# Patient Record
Sex: FEMALE | Race: WHITE | NOT HISPANIC OR LATINO | Employment: FULL TIME | URBAN - METROPOLITAN AREA
[De-identification: names, ages, dates, MRNs, and addresses within clinical notes are randomized per-mention and may not be internally consistent; named-entity substitution may affect disease eponyms.]

---

## 2017-06-10 ENCOUNTER — OFFICE VISIT (OUTPATIENT)
Dept: URGENT CARE | Facility: MEDICAL CENTER | Age: 47
End: 2017-06-10
Payer: COMMERCIAL

## 2017-06-10 DIAGNOSIS — R35.0 FREQUENCY OF MICTURITION: ICD-10-CM

## 2017-06-10 PROCEDURE — 99213 OFFICE O/P EST LOW 20 MIN: CPT

## 2017-06-11 ENCOUNTER — APPOINTMENT (OUTPATIENT)
Dept: LAB | Facility: HOSPITAL | Age: 47
End: 2017-06-11
Payer: COMMERCIAL

## 2017-06-11 DIAGNOSIS — R35.0 FREQUENCY OF MICTURITION: ICD-10-CM

## 2017-06-11 PROCEDURE — 87086 URINE CULTURE/COLONY COUNT: CPT

## 2017-06-11 PROCEDURE — 87077 CULTURE AEROBIC IDENTIFY: CPT

## 2017-06-11 PROCEDURE — 87186 SC STD MICRODIL/AGAR DIL: CPT

## 2017-06-13 LAB — BACTERIA UR CULT: NORMAL

## 2018-01-09 ENCOUNTER — TRANSCRIBE ORDERS (OUTPATIENT)
Dept: ADMINISTRATIVE | Facility: HOSPITAL | Age: 48
End: 2018-01-09

## 2018-01-09 DIAGNOSIS — Z12.39 SCREENING BREAST EXAMINATION: Primary | ICD-10-CM

## 2018-01-19 ENCOUNTER — HOSPITAL ENCOUNTER (OUTPATIENT)
Dept: RADIOLOGY | Age: 48
Discharge: HOME | End: 2018-01-19
Payer: COMMERCIAL

## 2018-01-19 DIAGNOSIS — Z12.39 SCREENING BREAST EXAMINATION: ICD-10-CM

## 2018-01-19 PROCEDURE — 77067 SCR MAMMO BI INCL CAD: CPT

## 2019-03-28 ENCOUNTER — TRANSCRIBE ORDERS (OUTPATIENT)
Dept: ADMINISTRATIVE | Facility: HOSPITAL | Age: 49
End: 2019-03-28

## 2019-03-28 DIAGNOSIS — Z12.39 SCREENING BREAST EXAMINATION: Primary | ICD-10-CM

## 2019-04-04 ENCOUNTER — HOSPITAL ENCOUNTER (OUTPATIENT)
Dept: RADIOLOGY | Age: 49
Discharge: HOME | End: 2019-04-04
Payer: COMMERCIAL

## 2019-04-04 VITALS — HEIGHT: 65 IN | BODY MASS INDEX: 37.99 KG/M2 | WEIGHT: 228 LBS

## 2019-04-04 DIAGNOSIS — Z12.39 SCREENING BREAST EXAMINATION: ICD-10-CM

## 2019-04-04 PROCEDURE — 77067 SCR MAMMO BI INCL CAD: CPT

## 2020-06-25 ENCOUNTER — TRANSCRIBE ORDERS (OUTPATIENT)
Dept: ADMINISTRATIVE | Facility: HOSPITAL | Age: 50
End: 2020-06-25

## 2020-06-25 DIAGNOSIS — Z12.31 OTHER SCREENING MAMMOGRAM: Primary | ICD-10-CM

## 2020-11-16 ENCOUNTER — OFFICE VISIT (OUTPATIENT)
Dept: URGENT CARE | Facility: CLINIC | Age: 50
End: 2020-11-16
Payer: COMMERCIAL

## 2020-11-16 VITALS
OXYGEN SATURATION: 99 % | HEART RATE: 76 BPM | TEMPERATURE: 97.2 F | DIASTOLIC BLOOD PRESSURE: 70 MMHG | RESPIRATION RATE: 18 BRPM | SYSTOLIC BLOOD PRESSURE: 150 MMHG

## 2020-11-16 DIAGNOSIS — R21 RASH: Primary | ICD-10-CM

## 2020-11-16 PROCEDURE — 99213 OFFICE O/P EST LOW 20 MIN: CPT | Performed by: NURSE PRACTITIONER

## 2020-11-16 RX ORDER — CEPHALEXIN 500 MG/1
500 CAPSULE ORAL EVERY 8 HOURS SCHEDULED
Qty: 21 CAPSULE | Refills: 0 | Status: SHIPPED | OUTPATIENT
Start: 2020-11-16 | End: 2020-11-16

## 2020-11-16 RX ORDER — OMEGA-3S/DHA/EPA/FISH OIL/D3 300MG-1000
400 CAPSULE ORAL DAILY
COMMUNITY

## 2020-11-16 RX ORDER — SPIRONOLACTONE 25 MG/1
25 TABLET ORAL DAILY
COMMUNITY

## 2020-11-16 RX ORDER — CEPHALEXIN 500 MG/1
500 CAPSULE ORAL EVERY 8 HOURS SCHEDULED
Qty: 21 CAPSULE | Refills: 0 | Status: SHIPPED | OUTPATIENT
Start: 2020-11-16 | End: 2020-11-23

## 2020-11-16 RX ORDER — CANDESARTAN 32 MG/1
32 TABLET ORAL DAILY
COMMUNITY

## 2020-11-24 ENCOUNTER — TRANSCRIBE ORDERS (OUTPATIENT)
Dept: ADMINISTRATIVE | Facility: HOSPITAL | Age: 50
End: 2020-11-24

## 2020-11-24 ENCOUNTER — HOSPITAL ENCOUNTER (OUTPATIENT)
Dept: RADIOLOGY | Age: 50
Discharge: HOME | End: 2020-11-24
Payer: COMMERCIAL

## 2020-11-24 VITALS — HEIGHT: 65 IN | WEIGHT: 260 LBS | BODY MASS INDEX: 43.32 KG/M2

## 2020-11-24 DIAGNOSIS — Z12.31 ENCOUNTER FOR SCREENING MAMMOGRAM FOR MALIGNANT NEOPLASM OF BREAST: Primary | ICD-10-CM

## 2020-11-24 DIAGNOSIS — Z12.31 OTHER SCREENING MAMMOGRAM: ICD-10-CM

## 2020-11-24 DIAGNOSIS — Z12.31 ENCOUNTER FOR SCREENING MAMMOGRAM FOR MALIGNANT NEOPLASM OF BREAST: ICD-10-CM

## 2020-11-24 PROCEDURE — 77067 SCR MAMMO BI INCL CAD: CPT

## 2020-11-24 PROCEDURE — 77063 BREAST TOMOSYNTHESIS BI: CPT

## 2021-03-10 DIAGNOSIS — Z23 ENCOUNTER FOR IMMUNIZATION: ICD-10-CM

## 2021-11-27 ENCOUNTER — NURSE TRIAGE (OUTPATIENT)
Dept: OTHER | Facility: OTHER | Age: 51
End: 2021-11-27

## 2021-11-27 DIAGNOSIS — Z11.59 SPECIAL SCREENING EXAMINATION FOR VIRAL DISEASE: Primary | ICD-10-CM

## 2021-11-27 PROCEDURE — U0003 INFECTIOUS AGENT DETECTION BY NUCLEIC ACID (DNA OR RNA); SEVERE ACUTE RESPIRATORY SYNDROME CORONAVIRUS 2 (SARS-COV-2) (CORONAVIRUS DISEASE [COVID-19]), AMPLIFIED PROBE TECHNIQUE, MAKING USE OF HIGH THROUGHPUT TECHNOLOGIES AS DESCRIBED BY CMS-2020-01-R: HCPCS | Performed by: FAMILY MEDICINE

## 2021-11-27 PROCEDURE — U0005 INFEC AGEN DETEC AMPLI PROBE: HCPCS | Performed by: FAMILY MEDICINE

## 2021-11-29 ENCOUNTER — TELEPHONE (OUTPATIENT)
Dept: URGENT CARE | Facility: CLINIC | Age: 51
End: 2021-11-29

## 2021-11-29 ENCOUNTER — TELEPHONE (OUTPATIENT)
Dept: OTHER | Facility: OTHER | Age: 51
End: 2021-11-29

## 2021-12-23 ENCOUNTER — HOSPITAL ENCOUNTER (OUTPATIENT)
Dept: RADIOLOGY | Facility: IMAGING CENTER | Age: 51
Discharge: HOME | End: 2021-12-23
Payer: COMMERCIAL

## 2021-12-23 VITALS — WEIGHT: 250 LBS | HEIGHT: 65 IN | BODY MASS INDEX: 41.65 KG/M2

## 2021-12-23 DIAGNOSIS — Z12.31 ENCOUNTER FOR SCREENING MAMMOGRAM FOR MALIGNANT NEOPLASM OF BREAST: ICD-10-CM

## 2021-12-23 PROCEDURE — 77067 SCR MAMMO BI INCL CAD: CPT

## 2021-12-23 PROCEDURE — 77063 BREAST TOMOSYNTHESIS BI: CPT

## 2022-08-09 ENCOUNTER — APPOINTMENT (OUTPATIENT)
Dept: RADIOLOGY | Facility: CLINIC | Age: 52
End: 2022-08-09
Payer: COMMERCIAL

## 2022-08-09 ENCOUNTER — OFFICE VISIT (OUTPATIENT)
Dept: URGENT CARE | Facility: CLINIC | Age: 52
End: 2022-08-09
Payer: COMMERCIAL

## 2022-08-09 VITALS
DIASTOLIC BLOOD PRESSURE: 71 MMHG | RESPIRATION RATE: 18 BRPM | HEART RATE: 65 BPM | WEIGHT: 250 LBS | TEMPERATURE: 97.8 F | SYSTOLIC BLOOD PRESSURE: 167 MMHG | OXYGEN SATURATION: 99 % | BODY MASS INDEX: 41.65 KG/M2 | HEIGHT: 65 IN

## 2022-08-09 DIAGNOSIS — S39.92XA INJURY OF COCCYX, INITIAL ENCOUNTER: Primary | ICD-10-CM

## 2022-08-09 DIAGNOSIS — S39.92XA INJURY OF COCCYX, INITIAL ENCOUNTER: ICD-10-CM

## 2022-08-09 PROCEDURE — 72220 X-RAY EXAM SACRUM TAILBONE: CPT

## 2022-08-09 PROCEDURE — 99213 OFFICE O/P EST LOW 20 MIN: CPT | Performed by: PHYSICIAN ASSISTANT

## 2022-08-09 NOTE — PROGRESS NOTES
[address] [facility]      NAME: Raven Morales is a 45 y o  female  : 1970    MRN: [id number]  DATE: 2022  TIME: 2:08 PM    Assessment and Plan   Injury of coccyx, initial encounter [S39 [id number]]  1  Injury of coccyx, initial encounter  XR sacrum and coccyx       Patient Instructions   Xray appears negative for any fracture  Will follow up with radiologist report when available  Recommend elevating body part, icing the area every 2 hours for 20-30 minutes, take Ibuprofen every 6-8 hours to reduce inflammation  If not improving over the next week, follow up with PCP or orthopedics  To present to the ER if symptoms worsen  Chief Complaint     Chief Complaint   Patient presents with    Fall     Tripped down steps, tailbone hurts         History of Present Illness   Raven Morales presents to the clinic c/o    Fall  The accident occurred 1 to 3 hours ago  Fall occurred: tripped over dog and landed on butt, tailbone pain since  The point of impact was the buttocks (tailbone area)  The pain is present in the buttocks (tailbone)  The pain is at a severity of 7/10  The pain is moderate  The symptoms are aggravated by sitting  Pertinent negatives include no abdominal pain, fever, headaches, loss of consciousness, nausea, numbness or tingling  She has tried ice for the symptoms  The treatment provided mild relief  Review of Systems   Review of Systems   Constitutional: Negative for chills, diaphoresis, fatigue and fever  HENT: Negative for congestion, ear discharge, ear pain and facial swelling  Eyes: Negative for photophobia, pain, discharge, redness, itching and visual disturbance  Respiratory: Negative for apnea, cough, chest tightness, shortness of breath and wheezing  Cardiovascular: Negative for chest pain and palpitations  Gastrointestinal: Negative for abdominal pain and nausea  Musculoskeletal: Positive for arthralgias (tailbone pain )     Skin: Negative for color change, rash and wound  Neurological: Negative for dizziness, tingling, loss of consciousness, numbness and headaches  Hematological: Negative for adenopathy           Current Medications     Long-Term Medications   Medication Sig Dispense Refill    candesartan (ATACAND) 32 MG tablet Take 32 mg by mouth daily      cholecalciferol (VITAMIN D3) 400 units tablet Take 400 Units by mouth daily      metFORMIN (GLUCOPHAGE) 500 mg tablet Take 500 mg by mouth 2 (two) times a day      spironolactone (ALDACTONE) 25 mg tablet Take 25 mg by mouth daily         Current Allergies     Allergies as of 08/09/2022 - Reviewed 08/09/2022   Allergen Reaction Noted    Dust mite extract Sneezing 11/24/2020    Other Rash 11/24/2020            The following portions of the patient's history were reviewed and updated as appropriate: allergies, current medications, past family history, past medical history, past social history, past surgical history and problem list   Past Medical History:   Diagnosis Date    Diabetes mellitus ([facility] [address] )     Hypertension      Past Surgical History:   Procedure Laterality Date    CHOLECYSTECTOMY      LAPAROSCOPIC GASTRIC BANDING      SHOULDER SURGERY Right 07/20/2022     Social History     Socioeconomic History    Marital status: /Civil Union     Spouse name: Not on file    Number of children: Not on file    Years of education: Not on file    Highest education level: Not on file   Occupational History    Not on file   Tobacco Use    Smoking status: Never Smoker    Smokeless tobacco: Never Used   Vaping Use    Vaping Use: Never used   Substance and Sexual Activity    Alcohol use: Not on file    Drug use: Never    Sexual activity: Not on file   Other Topics Concern    Not on file   Social History Narrative    Not on file     Social Determinants of Health     Financial Resource Strain: Not on file   Food Insecurity: Not on file   Transportation Needs: Not on file   Physical Activity: Not on file Stress: Not on file   Social Connections: Not on file   Intimate Partner Violence: Not on file   Housing Stability: Not on file       Objective   /71   Pulse 65   Temp 97 8 °F (36 6 °C)   Resp 18   Ht 5' 5" (1 651 m)   Wt 113 kg (250 lb)   SpO2 99%   BMI 41 60 kg/m²      Physical Exam     Physical Exam  Vitals and nursing note reviewed  Constitutional:       General: She is not in acute distress  Appearance: She is well-developed  She is not diaphoretic  HENT:      Head: Normocephalic and atraumatic  Right Ear: External ear normal       Left Ear: External ear normal       Nose: Nose normal    Eyes:      General: No scleral icterus  Right eye: No discharge  Left eye: No discharge  Conjunctiva/sclera: Conjunctivae normal    Cardiovascular:      Rate and Rhythm: Normal rate and regular rhythm  Heart sounds: Normal heart sounds  No murmur heard  No friction rub  No gallop  Pulmonary:      Effort: Pulmonary effort is normal  No respiratory distress  Breath sounds: Normal breath sounds  No decreased breath sounds, wheezing, rhonchi or rales  Musculoskeletal:      Lumbar back: Normal  No tenderness or bony tenderness  Normal range of motion  Comments: Tenderness noted over coccyx area   Skin:     General: Skin is warm and dry  Coloration: Skin is not pale  Findings: No erythema or rash  Neurological:      Mental Status: She is alert and oriented to person, place, and time  Psychiatric:         Behavior: Behavior normal          Thought Content:  Thought content normal          Judgment: Judgment normal          Alivia Prabhakar PA-C

## 2023-03-02 ENCOUNTER — HOSPITAL ENCOUNTER (OUTPATIENT)
Dept: RADIOLOGY | Facility: IMAGING CENTER | Age: 53
End: 2023-03-02

## 2023-03-02 VITALS — BODY MASS INDEX: 41.51 KG/M2 | WEIGHT: 249.12 LBS | HEIGHT: 65 IN

## 2023-03-02 DIAGNOSIS — Z12.31 ENCOUNTER FOR SCREENING MAMMOGRAM FOR MALIGNANT NEOPLASM OF BREAST: ICD-10-CM

## 2023-03-02 DIAGNOSIS — Z12.31 OTHER SCREENING MAMMOGRAM: ICD-10-CM

## 2023-12-12 ENCOUNTER — OFFICE VISIT (OUTPATIENT)
Dept: BARIATRICS | Facility: CLINIC | Age: 53
End: 2023-12-12
Payer: COMMERCIAL

## 2023-12-12 VITALS
OXYGEN SATURATION: 97 % | DIASTOLIC BLOOD PRESSURE: 80 MMHG | RESPIRATION RATE: 20 BRPM | SYSTOLIC BLOOD PRESSURE: 148 MMHG | WEIGHT: 242.4 LBS | BODY MASS INDEX: 40.39 KG/M2 | HEIGHT: 65 IN | TEMPERATURE: 98 F | HEART RATE: 73 BPM

## 2023-12-12 DIAGNOSIS — E66.01 MORBID OBESITY (HCC): Primary | ICD-10-CM

## 2023-12-12 DIAGNOSIS — E11.69 DIABETES MELLITUS TYPE 2 IN OBESE: ICD-10-CM

## 2023-12-12 DIAGNOSIS — I10 PRIMARY HYPERTENSION: ICD-10-CM

## 2023-12-12 DIAGNOSIS — E66.9 DIABETES MELLITUS TYPE 2 IN OBESE: ICD-10-CM

## 2023-12-12 PROCEDURE — 99212 OFFICE O/P EST SF 10 MIN: CPT | Performed by: PHYSICIAN ASSISTANT

## 2023-12-12 RX ORDER — ROSUVASTATIN CALCIUM 5 MG/1
5 TABLET, COATED ORAL DAILY
COMMUNITY

## 2023-12-12 NOTE — ASSESSMENT & PLAN NOTE
-Discussed options of [facility]-Intensive Lifestyle Intervention Program, Very Low Calorie Diet-VLCD, Conservative Program, Tawny-En-Y Gastric Bypass, and Vertical Sleeve Gastrectomy and the role of weight loss medications.  -Initial weight loss goal of 5-10% weight loss for improved health  -Screening labs: reviewed CMP, Lipid panel, TSH, HgbA1c  -Patient is interested in pursuing bariatric surgery. Reports she is working with Dr. Nicole Wong and will be having surgery sometime in Feb. As part of her program requirements, she must see a registered dietician. Informed her she will need to follow up with her surgical team for recommendations on who she can see.  Informed her our surgical dieticians only see patients who have had surgery performed by our surgical team.

## 2023-12-12 NOTE — ASSESSMENT & PLAN NOTE
Lab Results   Component Value Date    HGBA1C 7.1 (H) 10/31/2023   -On Metformin and Ozempic  -can improve with weight loss  -avoid/limit refined carbohydrates

## 2023-12-12 NOTE — PROGRESS NOTES
Assessment/Plan: Morbid obesity ([address])  -Discussed options of [facility]-Intensive Lifestyle Intervention Program, Very Low Calorie Diet-VLCD, Conservative Program, Tawny-En-Y Gastric Bypass, and Vertical Sleeve Gastrectomy and the role of weight loss medications.  -Initial weight loss goal of 5-10% weight loss for improved health  -Screening labs: reviewed CMP, Lipid panel, TSH, HgbA1c  -Patient is interested in pursuing bariatric surgery. Reports she is working with Dr. Fritz Noriega and will be having surgery sometime in Feb. As part of her program requirements, she must see a registered dietician. Informed her she will need to follow up with her surgical team for recommendations on who she can see. Informed her our surgical dieticians only see patients who have had surgery performed by our surgical team.    Primary hypertension  -On Atacand  -may improve with weight loss    Diabetes mellitus type 2 in obese     Lab Results   Component Value Date    HGBA1C 7.1 (H) 10/31/2023   -On Metformin and Ozempic  -can improve with weight loss  -avoid/limit refined carbohydrates        Follow up with Dr. Jey Fuentes team    Diagnoses and all orders for this visit:    Morbid obesity ([address])    Primary hypertension    Diabetes mellitus type 2 in obese     Other orders  -     semaglutide, 0.25 or 0.5 mg/dose, (Ozempic, 0.25 or 0.5 MG/DOSE,) 2 mg/1.5 mL injection pen; Inject 0.5 mg under the skin  -     rosuvastatin (CRESTOR) 5 mg tablet; Take 5 mg by mouth daily          Subjective:   Chief Complaint   Patient presents with    Consult       Patient ID: Abby Russell  is a 46 y.o. female with excess weight/obesity here to pursue weight managment.     Past Medical History:   Diagnosis Date    Diabetes mellitus ([address] )     Hypertension          HPI:  Obesity/Excess Weight:  Severity: Severe  Onset:  since childhood  Modifiers:  Lap Band 2006 in [facility] and removed Nov 2023 , Weight Watchers, Slim fast, low carb diet, self created diets  Contributing factors:  social eating, emotional eating  Associated symptoms:  Diabetes, HTN, feels uncomfortable    Goals: 199 lbs  Highest: 308lbs    308 Highest in 2006 when band placed  Anthony 240 with band    Hydration: water 8 cups, diet soda, diet snapple  ETOH: socially- indfrequently  Exercise: denies; may consider recumbent bike  Sleep: 8 hours  Occupation: sedentary,   Smoking: denies; rare marijuana    Colonoscopy:  completed 2020    The following portions of the patient's history were reviewed and updated as appropriate: allergies, current medications, past family history, past medical history, past social history, past surgical history, and problem list.    Review of Systems   Constitutional:  Negative for chills and fever. HENT:  Negative for sore throat. Respiratory:  Positive for cough. Negative for shortness of breath. Cardiovascular:  Negative for chest pain and palpitations. Gastrointestinal:  Positive for abdominal pain (around surgical sites) and constipation. Negative for nausea and vomiting. Genitourinary:  Negative for dysuria. Musculoskeletal:  Positive for arthralgias. Skin:  Negative for rash. Neurological:  Negative for dizziness and headaches. Psychiatric/Behavioral:  Negative for dysphoric mood. Objective:    /80 (BP Location: Left arm, Patient Position: Sitting, Cuff Size: Large)   Pulse 73   Temp 98 °F (36.7 °C)   Resp 20   Ht 5' 5" (1.651 m)   Wt 110 kg (242 lb 6.4 oz)   SpO2 97%   BMI 40.34 kg/m²     Physical Exam  Vitals and nursing note reviewed. Constitutional:       General: She is not in acute distress. Appearance: She is obese. She is not ill-appearing or toxic-appearing. HENT:      Head: Normocephalic and atraumatic. Mouth/Throat:      Mouth: Mucous membranes are moist.   Eyes:      General: No scleral icterus. Pulmonary:      Effort: Pulmonary effort is normal. No respiratory distress.    Abdominal: Comments: Obese, protuberant   Skin:     General: Skin is dry. Neurological:      General: No focal deficit present. Mental Status: She is alert and oriented to person, place, and time. Psychiatric:         Mood and Affect: Mood normal.         Behavior: Behavior normal.         Thought Content:  Thought content normal.         Judgment: Judgment normal.

## 2024-03-15 ENCOUNTER — HOSPITAL ENCOUNTER (OUTPATIENT)
Dept: RADIOLOGY | Facility: IMAGING CENTER | Age: 54
End: 2024-03-15
Payer: COMMERCIAL

## 2024-03-15 VITALS — WEIGHT: 214.6 LBS | BODY MASS INDEX: 36.64 KG/M2 | HEIGHT: 64 IN

## 2024-03-15 DIAGNOSIS — Z12.31 SCREENING MAMMOGRAM FOR HIGH-RISK PATIENT: ICD-10-CM

## 2024-03-15 PROCEDURE — 77063 BREAST TOMOSYNTHESIS BI: CPT

## 2024-03-15 PROCEDURE — 77067 SCR MAMMO BI INCL CAD: CPT

## 2024-03-29 ENCOUNTER — TELEPHONE (OUTPATIENT)
Age: 54
End: 2024-03-29

## 2024-03-29 NOTE — TELEPHONE ENCOUNTER
Pt had surgery in Feb at  and is unhappy with the follow up support there.  She would like to schedule an appt with a nurse so I transferred her to Wilda in the office

## 2024-04-02 ENCOUNTER — TELEPHONE (OUTPATIENT)
Dept: BARIATRICS | Facility: CLINIC | Age: 54
End: 2024-04-02

## 2024-04-02 NOTE — TELEPHONE ENCOUNTER
LVM to see if patient still wanted to come to [facility] for support. Patient has surgery in Feb of 24 with [facility] and wanted to come to us. Patient can just needs a 1hr appt with Vinh andres or [facility] as a Transfer Patient. If patient wants to come in sooner then what Herrera have could be with a surgeon for 30 min. Left office info to call back [phone]

## 2024-05-22 ENCOUNTER — OFFICE VISIT (OUTPATIENT)
Dept: URGENT CARE | Facility: CLINIC | Age: 54
End: 2024-05-22
Payer: COMMERCIAL

## 2024-05-22 ENCOUNTER — APPOINTMENT (OUTPATIENT)
Dept: RADIOLOGY | Facility: CLINIC | Age: 54
End: 2024-05-22
Payer: COMMERCIAL

## 2024-05-22 VITALS
SYSTOLIC BLOOD PRESSURE: 122 MMHG | OXYGEN SATURATION: 99 % | HEART RATE: 69 BPM | DIASTOLIC BLOOD PRESSURE: 80 MMHG | HEIGHT: 64 IN | TEMPERATURE: 97.2 F | BODY MASS INDEX: 36.84 KG/M2 | RESPIRATION RATE: 18 BRPM

## 2024-05-22 DIAGNOSIS — S29.9XXA RIB INJURY: Primary | ICD-10-CM

## 2024-05-22 DIAGNOSIS — R07.81 RIB PAIN ON RIGHT SIDE: ICD-10-CM

## 2024-05-22 DIAGNOSIS — S29.9XXA RIB INJURY: ICD-10-CM

## 2024-05-22 PROCEDURE — 71101 X-RAY EXAM UNILAT RIBS/CHEST: CPT

## 2024-05-22 PROCEDURE — 99213 OFFICE O/P EST LOW 20 MIN: CPT | Performed by: PHYSICIAN ASSISTANT

## 2024-05-22 RX ORDER — DIPHENOXYLATE HYDROCHLORIDE AND ATROPINE SULFATE 2.5; .025 MG/1; MG/1
1 TABLET ORAL DAILY
COMMUNITY

## 2024-05-22 NOTE — PROGRESS NOTES
[facility]        NAME: Roz Gavin is a 53 y.o. female  : 1970    MRN: [id number]  DATE: May 22, 2024  TIME: 4:39 PM    Assessment and Plan   Rib injury [S29.9XXA]  1. Rib injury  XR ribs right w pa chest min 3 views      2. Rib pain on right side  XR ribs right w pa chest min 3 views        Xray- negative for obvious acute osseous abnormality, pending final read  No red flag symptoms      Patient Instructions     Rest, ice/heat to the area. Take multiple deep breaths as discussed.   Call tomorrow for official xray results.   Call PCP today and follow-up with them in the next 1-2 days for further evaluation and treatment.   Go to the ER immediately if any numbness, tingling, weakness, change in intensity or location of pain, chest pain, shortness of breath, abdominal pain, nausea, vomiting, bowel/bladder dysfunction, other new or concerning symptoms     Chief Complaint     Chief Complaint   Patient presents with    Back Pain     On Monday her dog jumped on to her back while she was in bed. Only hurts when she bends her back         History of Present Illness       53-year-old female presents for evaluation of right-sided rib pain after an injury that occurred 3 days ago.  States on Monday she was laying in her bed when her approximately 40 pound dog jumped on the right side of her ribs.  She denies any other falls, injury or trauma to the area.  She denies any swelling, bruising lacerations, or other deformity or abnormalities.  States pain is worse with turning side-to-side or bending forward.  She has not tried anything for it but wanted to come get an x-ray just to make sure she did not have any broken ribs.  She denies any pain with breathing.  She denies any fevers, chills, chest pain, chest tightness, shortness of breath, wheezing, abdominal pain, nausea, vomiting, back pain, numbness, tingling, weakness, bowel/bladder dysfunction, saddle anesthesia, radiation of the pain, GI/  symptoms or other complaints.        Review of Systems   Review of Systems   Constitutional:  Negative for activity change, appetite change, fatigue and fever.   Respiratory:  Negative for cough and shortness of breath.    Cardiovascular:  Negative for chest pain and leg swelling.   Gastrointestinal:  Negative for abdominal pain, diarrhea, nausea and vomiting.   Genitourinary:  Negative for decreased urine volume, difficulty urinating, dysuria, flank pain, frequency, hematuria, pelvic pain and urgency.   Musculoskeletal:  Positive for arthralgias (right sided rib pain) and myalgias. Negative for back pain, joint swelling, neck pain and neck stiffness.   Skin:  Negative for rash and wound.   Neurological:  Negative for dizziness, syncope, weakness, light-headedness, numbness and headaches.   All other systems reviewed and are negative.        Current Medications       Current Outpatient Medications:     candesartan (ATACAND) 32 MG tablet, Take 32 mg by mouth daily, Disp: , Rfl:     rosuvastatin (CRESTOR) 5 mg tablet, Take 5 mg by mouth daily, Disp: , Rfl:     spironolactone (ALDACTONE) 25 mg tablet, Take 25 mg by mouth daily, Disp: , Rfl:     Calcium Carbonate 500 (200 Ca) MG WAFR, Take by mouth, Disp: , Rfl:     cholecalciferol (VITAMIN D3) 400 units tablet, Take 400 Units by mouth daily, Disp: , Rfl:     metFORMIN (GLUCOPHAGE) 500 mg tablet, Take 500 mg by mouth 2 (two) times a day, Disp: , Rfl:     multivitamin (THERAGRAN) TABS, Take 1 tablet by mouth daily, Disp: , Rfl:     semaglutide, 0.25 or 0.5 mg/dose, (Ozempic, 0.25 or 0.5 MG/DOSE,) 2 mg/1.5 mL injection pen, Inject 0.5 mg under the skin, Disp: , Rfl:     Current Allergies     Allergies as of 05/22/2024 - Reviewed 05/22/2024   Allergen Reaction Noted    Dust mite extract Sneezing 11/24/2020    Other Rash 11/24/2020            The following portions of the patient's history were reviewed and updated as appropriate: allergies, current medications, past family  "history, past medical history, past social history, past surgical history and problem list.     Past Medical History:   Diagnosis Date    Diabetes mellitus (HCC)     Hypertension        Past Surgical History:   Procedure Laterality Date    CARPAL TUNNEL RELEASE Bilateral     CHOLECYSTECTOMY      KNEE SURGERY      meniscal repair    LAPAROSCOPIC GASTRIC BANDING      PANNICULECTOMY      SHOULDER SURGERY Right 07/20/2022       Family History   Problem Relation Age of Onset    Cervical cancer Mother 68    No Known Problems Father     No Known Problems Sister     No Known Problems Sister     Stroke Maternal Grandmother     No Known Problems Maternal Grandfather     No Known Problems Paternal Grandmother     No Known Problems Paternal Grandfather     No Known Problems Paternal Aunt     Heart disease Neg Hx          Medications have been verified.        Objective   /80   Pulse 69   Temp (!) 97.2 °F (36.2 °C)   Resp 18   Ht 5' 4\" (1.626 m)   SpO2 99%   BMI 36.84 kg/m²        Physical Exam     Physical Exam  Vitals and nursing note reviewed.   Constitutional:       General: She is not in acute distress.     Appearance: Normal appearance. She is well-developed. She is not ill-appearing or toxic-appearing.   HENT:      Mouth/Throat:      Mouth: Mucous membranes are moist.   Cardiovascular:      Rate and Rhythm: Normal rate and regular rhythm.      Heart sounds: Normal heart sounds.   Pulmonary:      Effort: Pulmonary effort is normal.      Breath sounds: Normal breath sounds. No wheezing.   Chest:      Chest wall: Tenderness present. No lacerations, deformity or swelling.          Comments: Full range of motion of bilateral arms without any reproducible pain to the right ribs  Abdominal:      General: Bowel sounds are normal.      Palpations: Abdomen is soft.      Tenderness: There is no abdominal tenderness. There is no guarding or rebound.   Musculoskeletal:      Cervical back: Normal. No tenderness. Normal " range of motion.      Thoracic back: Normal. Normal range of motion (but mild pain to the right ribs with turning to the left and bending forward).      Lumbar back: Normal. No tenderness. Normal range of motion. Negative right straight leg raise test and negative left straight leg raise test.   Skin:     Capillary Refill: Capillary refill takes less than 2 seconds.      Findings: No bruising or erythema.   Neurological:      General: No focal deficit present.      Mental Status: She is alert and oriented to person, place, and time.      Sensory: Sensation is intact.      Motor: Motor function is intact.      Deep Tendon Reflexes: Reflexes are normal and symmetric.      Comments: NV intact with sensation and strong peripheral pulses. 5/5 strength of UE/LE bilaterally   Psychiatric:         Behavior: Behavior normal.

## 2024-05-22 NOTE — PATIENT INSTRUCTIONS
Rest, ice/heat to the area. Take multiple deep breaths as discussed.   Call tomorrow for official xray results.   Call PCP today and follow-up with them in the next 1-2 days for further evaluation and treatment.   Go to the ER immediately if any numbness, tingling, weakness, change in intensity or location of pain, chest pain, shortness of breath, abdominal pain, nausea, vomiting, bowel/bladder dysfunction, other new or concerning symptoms

## 2024-07-10 ENCOUNTER — OFFICE VISIT (OUTPATIENT)
Dept: BARIATRICS | Facility: CLINIC | Age: 54
End: 2024-07-10
Payer: COMMERCIAL

## 2024-07-10 VITALS
HEIGHT: 65 IN | DIASTOLIC BLOOD PRESSURE: 70 MMHG | SYSTOLIC BLOOD PRESSURE: 118 MMHG | BODY MASS INDEX: 31.57 KG/M2 | TEMPERATURE: 96.2 F | HEART RATE: 88 BPM | WEIGHT: 189.5 LBS

## 2024-07-10 DIAGNOSIS — K91.2 POSTSURGICAL MALABSORPTION: ICD-10-CM

## 2024-07-10 DIAGNOSIS — E66.9 OBESITY, CLASS I, BMI 30-34.9: ICD-10-CM

## 2024-07-10 DIAGNOSIS — Z48.815 ENCOUNTER FOR SURGICAL AFTERCARE FOLLOWING SURGERY OF DIGESTIVE SYSTEM: Primary | ICD-10-CM

## 2024-07-10 DIAGNOSIS — I10 PRIMARY HYPERTENSION: ICD-10-CM

## 2024-07-10 DIAGNOSIS — Z98.84 BARIATRIC SURGERY STATUS: ICD-10-CM

## 2024-07-10 PROCEDURE — 99213 OFFICE O/P EST LOW 20 MIN: CPT | Performed by: PHYSICIAN ASSISTANT

## 2024-07-10 NOTE — PROGRESS NOTES
Assessment/Plan:     Patient ID: Roz Gavin is a 53 y.o. female.     Bariatric Surgery Status    -s/p Vertical Sleeve Gastrectomy with Dr young in 2/2024. Presents to the office today as new pt to establish care.   Overall is doing very well   Wants to see RD so will schedule     Continued/Maintain healthy weight loss with good nutrition intakes.  Adequate hydration with at least 64oz. fluid intake.  Follow diet as discussed.  Follow vitamin and mineral recommendations as reviewed with you.  Exercise as tolerated.    Colonoscopy referral made: scheduled   Msmmo: utd     Follow-up in 1 year. We kindly ask that your arrive 15 minutes before your scheduled appointment time with your provider to allow our staff to room you, get your vital signs and update your chart.    Get lab work done. Please call the office if you need a script.  It is recommended to check with your insurance BEFORE getting labs done to make sure they are covered by your policy.      Call our office if you have any problems with abdominal pain especially associated with fever, chills, nausea, vomiting or any other concerns.    All  Post-bariatric surgery patients should be aware that very small quantities of any alcohol can cause impairment and it is very possible not to feel the effect. The effect can be in the system for several hours.  It is also a stomach irritant.     It is advised to AVOID alcohol, Nonsteroidal antiinflammatory drugs (NSAIDS) and nicotine of all forms . Any of these can cause stomach irritation/pain.    Discussed the effects of alcohol on a bariatric patient and the increased impairment risk.     Keep up the good work!     Postsurgical Malabsorption   -At risk for malabsorption of vitamins/minerals secondary to malabsorption and restriction of intake from bariatric surgery  -Currently taking adequate postop bariatric surgery vitamin supplementation  -Last set of bariatric labs completed 4/2024 and wnl   -Patient received  education about the importance of adhering to a lifelong supplementation regimen to avoid vitamin/mineral deficiencies      Diagnoses and all orders for this visit:    Encounter for surgical aftercare following surgery of digestive system  -     PTH, intact; Future  -     Zinc; Future  -     Vitamin B1, whole blood; Future  -     Comprehensive metabolic panel; Future  -     Vitamin A; Future    Bariatric surgery status  -     PTH, intact; Future  -     Zinc; Future  -     Vitamin B1, whole blood; Future  -     Comprehensive metabolic panel; Future  -     Vitamin A; Future    Postsurgical malabsorption  -     PTH, intact; Future  -     Zinc; Future  -     Vitamin B1, whole blood; Future  -     Comprehensive metabolic panel; Future  -     Vitamin A; Future    Primary hypertension  -     PTH, intact; Future  -     Zinc; Future  -     Vitamin B1, whole blood; Future  -     Comprehensive metabolic panel; Future  -     Vitamin A; Future    Obesity, Class I, BMI 30-34.9  -     PTH, intact; Future  -     Zinc; Future  -     Vitamin B1, whole blood; Future  -     Comprehensive metabolic panel; Future  -     Vitamin A; Future         Subjective:      Patient ID: Roz Gavin is a 53 y.o. female.      -s/p Vertical Sleeve Gastrectomy with Dr young in 2/2024. Presents to the office today as new pt to establish care.   Overall is doing very well Presents to the office today for routine follow up. Tolerating diet without issues; denies N/V, dysphagia, reflux.     Initial: 256  Current: 189  EWL: (Weight loss is ahead of schedule at this post surgical period.)  Anthony: current   Current BMI is Body mass index is 31.53 kg/m².    Tolerating a regular diet-yes  Eating at least 60 grams of protein per day-yes  Drinking at least 64 ounces of fluid per day-yes  Sufficient exercise- yes, tracking steps and bike   Using NSAIDs regularly-no  Using nicotine-no  Using alcohol-rare  Supplements: Multivitamins + calcium     EWL is 57%,  "which places the patient ahead of schedule for expected post surgical weight loss at this time.     The following portions of the patient's history were reviewed and updated as appropriate: allergies, current medications, past family history, past medical history, past social history, past surgical history and problem list.    Review of Systems   Constitutional: Negative.    Respiratory: Negative.     Cardiovascular: Negative.    Gastrointestinal: Negative.    Neurological: Negative.    Psychiatric/Behavioral: Negative.           Objective:    /70   Pulse 88   Temp (!) 96.2 °F (35.7 °C) (Tympanic)   Ht 5' 5\" (1.651 m)   Wt 86 kg (189 lb 8 oz)   BMI 31.53 kg/m²      Physical Exam  Vitals and nursing note reviewed.   Constitutional:       Appearance: Normal appearance. She is obese.   HENT:      Head: Normocephalic and atraumatic.   Eyes:      Extraocular Movements: Extraocular movements intact.      Pupils: Pupils are equal, round, and reactive to light.   Cardiovascular:      Rate and Rhythm: Normal rate.   Pulmonary:      Effort: Pulmonary effort is normal.   Musculoskeletal:         General: Normal range of motion.      Cervical back: Normal range of motion.   Skin:     General: Skin is warm and dry.   Neurological:      General: No focal deficit present.      Mental Status: She is alert and oriented to person, place, and time.   Psychiatric:         Mood and Affect: Mood normal.             "

## 2024-07-10 NOTE — PATIENT INSTRUCTIONS
Follow-up in 1 year. We kindly ask that your arrive 15 minutes before your scheduled appointment time with your provider to allow our staff to room you, get your vital signs and update your chart.    Get lab work done. Please call the office if you need a script.  It is recommended to check with your insurance BEFORE getting labs done to make sure they are covered by your policy.      Call our office if you have any problems with abdominal pain especially associated with fever, chills, nausea, vomiting or any other concerns.    All  Post-bariatric surgery patients should be aware that very small quantities of any alcohol can cause impairment and it is very possible not to feel the effect. The effect can be in the system for several hours.  It is also a stomach irritant.     It is advised to AVOID alcohol, Nonsteroidal antiinflammatory drugs (NSAIDS) and nicotine of all forms . Any of these can cause stomach irritation/pain.    Discussed the effects of alcohol on a bariatric patient and the increased impairment risk.     Keep up the good work!

## 2024-07-30 ENCOUNTER — CLINICAL SUPPORT (OUTPATIENT)
Dept: BARIATRICS | Facility: CLINIC | Age: 54
End: 2024-07-30

## 2024-07-30 DIAGNOSIS — I10 PRIMARY HYPERTENSION: ICD-10-CM

## 2024-07-30 DIAGNOSIS — E11.69 TYPE 2 DIABETES MELLITUS WITH OBESITY  (HCC): ICD-10-CM

## 2024-07-30 DIAGNOSIS — Z98.84 BARIATRIC SURGERY STATUS: ICD-10-CM

## 2024-07-30 DIAGNOSIS — E66.01 MORBID OBESITY (HCC): Primary | ICD-10-CM

## 2024-07-30 DIAGNOSIS — E66.9 TYPE 2 DIABETES MELLITUS WITH OBESITY  (HCC): ICD-10-CM

## 2024-07-30 PROCEDURE — RECHECK: Performed by: DIETITIAN, REGISTERED

## 2024-07-30 PROCEDURE — WMDI30: Performed by: DIETITIAN, REGISTERED

## 2024-07-30 NOTE — PROGRESS NOTES
Bariatric Follow Up Nutrition Note    Type of surgery  adjustable gastric banding at [facility]  Surgery Date: 12/2006  18 years post-op  Surgeon: Dr. Anabela Smith    Lap Band Removal at [facility]  Surgery Date: 11/15/2023  9 months post-op  Surgeon: Oliverio Escalante     Vertical sleeve gastrectomy at [facility]  Surgery Date: 2/5/2024  6 months post-op  Surgeon: Oliverio Escalante     Nutrition Assessment   Roz Gavin  53 y.o.  female  There were no vitals taken for this visit.  Wt Readings from Last 10 Encounters:   07/10/24 86 kg (189 lb 8 oz)   03/15/24 97.3 kg (214 lb 9.6 oz)   12/12/23 110 kg (242 lb 6.4 oz)   03/02/23 113 kg (249 lb 1.9 oz)   08/09/22 113 kg (250 lb)   12/23/21 113 kg (250 lb)   11/24/20 118 kg (260 lb)   04/04/19 103 kg (228 lb)     Gregg Warren Equation:    BMR= 1463kcal  Estimated calories for weight maintenance:  1755kcal  (sedentary)  Estimated calories for weight loss 1255kcal (1# per wk wt loss - sedentary)  Estimated protein needs 68.3-102.4g (1.0-1.5 gms/kg IBW)  Estimated fluid needs 2049-2391ml (30-35 ml/kg IBW)    12/2006: Weight on Day of Lap Band Surgery: 308lbs  11/2023: Weight on Day of Lap Band Removal Surgery: 239lbs  2/2024: Weight on Day of Sleeve Gastrectomy Surgery: 230lbs  Weight in (lb) to have BMI = 25: 150.25lbs  Pre-Op Excess Wt: 157.75lbs  Post-Op Wt Loss: 118.4#/ 75% EBWL/ 38.4 % TBWL  in 18 year(s)    Review of History and Medications   Past Medical History:   Diagnosis Date    Arthritis     enrique    Diabetes mellitus (HCC)     Hypertension      Past Surgical History:   Procedure Laterality Date    CARPAL TUNNEL RELEASE Bilateral     CHOLECYSTECTOMY      KNEE SURGERY      meniscal repair    LAPAROSCOPIC GASTRIC BANDING      PANNICULECTOMY      SHOULDER SURGERY Right 07/20/2022     Social History     Socioeconomic History    Marital status: /Civil Union     Spouse name: Not on file    Number of children: Not on file    Years of education:  Not on file    Highest education level: Not on file   Occupational History    Not on file   Tobacco Use    Smoking status: Former     Types: Cigarettes    Smokeless tobacco: Never   Vaping Use    Vaping status: Never Used   Substance and Sexual Activity    Alcohol use: Not Currently     Alcohol/week: 1.0 standard drink of alcohol     Types: 1 Cans of beer per week     Comment: socially    Drug use: Yes     Types: Marijuana     Comment: Infrequently n socially    Sexual activity: Yes     Partners: Male     Birth control/protection: Post-menopausal   Other Topics Concern    Not on file   Social History Narrative    Not on file     Social Determinants of Health     Financial Resource Strain: Low Risk  (4/24/2024)    Received from [facility]    Overall Financial Resource Strain (CARDIA)     Difficulty of Paying Living Expenses: Not very hard   Food Insecurity: Food Insecurity Present (4/24/2024)    Received from [facility]    Hunger Vital Sign     Worried About Running Out of Food in the Last Year: Never true     Ran Out of Food in the Last Year: Sometimes true   Transportation Needs: No Transportation Needs (4/24/2024)    Received from [facility]    PRAPARE - Transportation     Lack of Transportation (Medical): No     Lack of Transportation (Non-Medical): No   Physical Activity: Not on file   Stress: No Stress Concern Present (4/24/2024)    Received from [facility]    [employer] - Occupational Stress Questionnaire     Feeling of Stress : Only a little   Social Connections: Feeling Socially Integrated (4/24/2024)    Received from [facility]    OASIS : Social Isolation     How often do you feel lonely or isolated from those around you?: Rarely   Intimate Partner Violence: Not At Risk (4/24/2024)    Received from [facility]    Humiliation, Afraid, Rape, and Kick questionnaire      Fear of Current or Ex-Partner: No     Emotionally Abused: No     Physically Abused: No     Sexually Abused: No   Housing Stability: Unknown (4/24/2024)    Received from [facility]    Housing Stability Vital Sign     Unable to Pay for Housing in the Last Year: No     Number of Times Moved in the Last Year: Not on file     Homeless in the Last Year: No       Current Outpatient Medications:     Calcium Carbonate 500 (200 Ca) MG WAFR, Take by mouth, Disp: , Rfl:     candesartan (ATACAND) 32 MG tablet, Take 32 mg by mouth daily (Patient not taking: Reported on 7/10/2024), Disp: , Rfl:     cholecalciferol (VITAMIN D3) 400 units tablet, Take 400 Units by mouth daily (Patient not taking: Reported on 7/10/2024), Disp: , Rfl:     metFORMIN (GLUCOPHAGE) 500 mg tablet, Take 500 mg by mouth 2 (two) times a day (Patient not taking: Reported on 7/10/2024), Disp: , Rfl:     multivitamin (THERAGRAN) TABS, Take 1 tablet by mouth daily, Disp: , Rfl:     rosuvastatin (CRESTOR) 5 mg tablet, Take 5 mg by mouth daily, Disp: , Rfl:     semaglutide, 0.25 or 0.5 mg/dose, (Ozempic, 0.25 or 0.5 MG/DOSE,) 2 mg/1.5 mL injection pen, Inject 0.5 mg under the skin (Patient not taking: Reported on 7/10/2024), Disp: , Rfl:     spironolactone (ALDACTONE) 25 mg tablet, Take 25 mg by mouth daily, Disp: , Rfl:     Food Intake and Lifestyle Assessment   Food Intake Assessment completed via usual diet recall  Wakes 11am  Breakfast: 11:30am: one egg, spinach, peppers, onions  Snack: cheese stick or fruit   Lunch: 4 oz lean protien, vegetables  Snack: cheese stick or fruit  Dinner: measures out 4 oz lean protein- either chicken or 93/76 ground meats, vegetables  Snack: none  Beverage intake: water and diet snapple, flavor packets  Diet texture/stage: regular  Protein supplement: not currently-getting >60g with her foods  Estimated protein intake per day: >60g  Estimated fluid intake per day: >64oz  Meals eaten away from home: maybe twice a  "week- orders a lean protien and a salad- can last her up to 3 meals.  Typical meal pattern: 3 meals per day and 2 snacks per day  Eating Behaviors: Appropriate diet advancement, Appropriate portion sizes, and Does not drink with meals and waits 30-minutes after meal before resuming drinking  Pt reports some struggle with mindless eating/picking/snacking  Food allergies or intolerances: NKFA  Cultural or Faith considerations: none noted  Loves fruit    Vitamin and Mineral regimen Centrum MVI, was taking sublingual B12    Physical Assessment  Nutrition Related Findings  Pt denies any nausea, vomiting, diarrhea, reflux, or dumping.  Pt does report some mild constipation, but reports has a BM at least every 2 days.  Pt was taking a fiber gummy.    Physical Activity  Types of exercise: mainly cardio- pt reports aims for 5 miles per day stationary bike or walking.  No resistance training.  Current physical limitations: none noted    Psychosocial Assessment   Support systems: spouse  Socioeconomic factors: works FT for [employer]-not currently working per pt report.  Pt reports when she is working she works second shift.    Nutrition Diagnosis  Diagnosis: Overweight / Obesity (NC-3.3) and Altered GI function (NC-1.4)  Related to: Altered GI function  As Evidenced by: BMI >25 and s/p bariatric surgery, 6 months post-op     Nutrition Prescription: Recommend the following diet  Low fat, Low sugar, High protein, and Regular    Interventions and Teaching   Patient educated on post-op nutrition guidelines.       Patient educated and handouts provided.  Surgical changes to stomach / GI  Capacity of post-surgery stomach  Diet progression  Adequate hydration  Sugar and fat restriction to decrease \"dumping syndrome\"  Expected weight loss  Weight loss plateaus/ possibility of weight regain  Exercise  Nutrition considerations after surgery  Protein supplements  Meal planning and preparation  Appropriate carbohydrate, protein, and fat " intake, and food/fluid choices to maximize safe weight loss, nutrient intake, and tolerance   Dietary and lifestyle changes  Possible problems with poor eating habits  Techniques for self monitoring and keeping daily food journal  Potential for food intolerance after surgery, and ways to deal with them including: lactose intolerance, nausea, reflux, vomiting, diarrhea, food intolerance, appetite changes, gas  Vitamin / Mineral supplementation of Multivitamin with minerals and Calcium    Provided pt with new bariatric manual.  Reviewed support options in introduction chapter.  Reviewed features in S[employer] bhavani.  Reviewed pre-op goals checklist in ch 2 with pt.  Reviewed post-op diet progression, nutrition rules and facts, post-op portion size progression, nutrition tracker goals.  Reviewed post-op constipation tips.  Provided pt with chewable and capsule bariatric multivitamin and calcium comparison charts.    Patient is not currently pregnant and doesn't desire to become pregnant a minimum of one year post-op    Education provided to: patient    Barriers to learning: No barriers identified    Readiness to change: action    Comprehension: verbalizes understanding     Expected Compliance: excellent    Evaluation/Monitoring   Eating pattern as discussed Tolerance of nutrition prescription Body weight Lab values Physical activity Bowel pattern    Goals  Food journal, Exercise 30 minutes 5 times per week, Eat 3 meals per day, Eliminate mindless snacking, and incorporate resistance exercises at least two days a week.     Time Spent:   45 Minutes

## 2024-08-22 RX ORDER — OMEPRAZOLE 20 MG/1
20 TABLET, DELAYED RELEASE ORAL AS NEEDED
COMMUNITY

## 2024-08-22 NOTE — PRE-PROCEDURE INSTRUCTIONS
Pre-Surgery Instructions:   Medication Instructions    Calcium Carbonate 500 (200 Ca) MG WAFR Hold day of surgery.    Calcium Polycarbophil (FIBER-CAPS PO) Hold day of surgery.    candesartan (ATACAND) 32 MG tablet Hold day of surgery.    multivitamin (THERAGRAN) TABS Instructions provided by MD    omeprazole (PriLOSEC OTC) 20 MG tablet Uses PRN- OK to take day of surgery    rosuvastatin (CRESTOR) 5 mg tablet Take day of surgery.    spironolactone (ALDACTONE) 25 mg tablet Hold day of surgery.   Medication instructions for day surgery reviewed. Please use only a sip of water to take your instructed medications. Avoid all over the counter vitamins, supplements and NSAIDS for one week prior to surgery per anesthesia guidelines. Tylenol is ok to take as needed.     You will receive a call one business day prior to surgery with an arrival time and hospital directions. If your surgery is scheduled on a Monday, the hospital will be calling you on the Friday prior to your surgery. If you have not heard from anyone by 8pm, please call the hospital supervisor through the hospital  at [phone]. ([place] [phone] or [place] [phone]).    Do not eat or drink anything after midnight the night before your surgery, including candy, mints, lifesavers, or chewing gum. Do not drink alcohol 24hrs before your surgery. Try not to smoke at least 24hrs before your surgery.       Follow the pre surgery showering instructions as listed in the “My Surgical Experience Booklet” or otherwise provided by your surgeon's office. Do not use a blade to shave the surgical area 1 week before surgery. It is okay to use a clean electric clippers up to 24 hours before surgery. Do not apply any lotions, creams, including makeup, cologne, deodorant, or perfumes after showering on the day of your surgery. Do not use dry shampoo, hair spray, hair gel, or any type of hair products.     No contact lenses, eye make-up, or artificial  eyelashes. Remove nail polish, including gel polish, and any artificial, gel, or acrylic nails if possible. Remove all jewelry including rings and body piercing jewelry.     Wear causal clothing that is easy to take on and off. Consider your type of surgery.    Keep any valuables, jewelry, piercings at home. Please bring any specially ordered equipment (sling, braces) if indicated.    Arrange for a responsible person to drive you to and from the hospital on the day of your surgery. Please confirm the visitor policy for the day of your procedure when you receive your phone call with an arrival time.     Call the surgeon's office with any new illnesses, exposures, or additional questions prior to surgery.    Please reference your “My Surgical Experience Booklet” for additional information to prepare for your upcoming surgery.

## 2024-08-30 NOTE — H&P
H&P Exam - Roz Gavin 53 y.o. female MRN: [id number]    Unit/Bed#:  Encounter: [id number]    Assessment:  Bilateral macromastia    Plan:  Bilateral breast reduction mammoplasty    History of Present Illness   This is a 53-year-old female with excessive breast tissue that hangs down over her abdomen not supported well on the chest wall there is a chronic intertriginous rash in the inframammary fold patient complains of upper back and neck strain    Review of Systems   All other systems reviewed and are negative.      Historical Information   Past Medical History:   Diagnosis Date    Anesthesia complication     difficulty awakening    Arthritis     enrique    COVID     2022    Diabetes mellitus (HCC)     diet controlled    GERD (gastroesophageal reflux disease)     History of bariatric surgery     Hypertension      Past Surgical History:   Procedure Laterality Date    CARPAL TUNNEL RELEASE Bilateral     CHOLECYSTECTOMY      COLONOSCOPY      GASTRECTOMY SLEEVE LAPAROSCOPIC      KNEE SURGERY Bilateral     meniscal repair    LAPAROSCOPIC GASTRIC BANDING      OTHER SURGICAL HISTORY Left     cubital tunnel release    PANNICULECTOMY      REMOVAL GASTRIC BAND LAPAROSCOPIC      SHOULDER SURGERY Right 07/20/2022     Social History   Social History     Substance and Sexual Activity   Alcohol Use Not Currently    Alcohol/week: 1.0 standard drink of alcohol    Types: 1 Cans of beer per week    Comment: rarely     Social History     Substance and Sexual Activity   Drug Use Yes    Types: Marijuana    Comment: Infrequently n socially     Social History     Tobacco Use   Smoking Status Never   Smokeless Tobacco Never     E-Cigarette Use: Never User     E-Cigarette/Vaping Substances       Family History: non-contributory    Meds/Allergies   all medications and allergies reviewed  Allergies   Allergen Reactions    Dust Mite Extract Sneezing    Nsaids Other (See Comments)     Hx bariatric surgery     Medical Tape Rash     Use paper tape  "   Other Rash     Adhesive tape       Objective   First Vitals:   Height: 5' 4\" (162.6 cm) (08/22/24 1250)  Weight - Scale: 84.8 kg (187 lb) (08/22/24 1250)    Current Vitals:   Height: 5' 4\" (162.6 cm) (08/22/24 1250)  Weight - Scale: 84.8 kg (187 lb) (08/22/24 1250)    No intake or output data in the 24 hours ending 08/30/24 1546    Invasive Devices       None                   Physical Exam examination of the head ears eyes nose and throat is within normal limits heart sounds S1-S2 heard no murmurs or gallops lungs clear abdomen soft extremities are within normal limits bilateral breast are large heavy pendulous no masses felt    Lab Results:   Imaging:   EKG, Pathology, and Other Studies:     Code Status: No Order  Advance Directive and Living Will:      Power of :    POLST:      Counseling / Coordination of Care:   None  "

## 2024-09-01 ENCOUNTER — ANESTHESIA EVENT (OUTPATIENT)
Dept: PERIOP | Facility: HOSPITAL | Age: 54
End: 2024-09-01
Payer: COMMERCIAL

## 2024-09-03 ENCOUNTER — ANESTHESIA (OUTPATIENT)
Dept: PERIOP | Facility: HOSPITAL | Age: 54
End: 2024-09-03
Payer: COMMERCIAL

## 2024-09-03 ENCOUNTER — HOSPITAL ENCOUNTER (OUTPATIENT)
Facility: HOSPITAL | Age: 54
Setting detail: OUTPATIENT SURGERY
Discharge: HOME | End: 2024-09-03
Attending: PLASTIC SURGERY | Admitting: PLASTIC SURGERY
Payer: COMMERCIAL

## 2024-09-03 VITALS
SYSTOLIC BLOOD PRESSURE: 124 MMHG | BODY MASS INDEX: 31.41 KG/M2 | HEIGHT: 64 IN | OXYGEN SATURATION: 99 % | HEART RATE: 60 BPM | WEIGHT: 184 LBS | DIASTOLIC BLOOD PRESSURE: 71 MMHG | TEMPERATURE: 96.8 F | RESPIRATION RATE: 18 BRPM

## 2024-09-03 DIAGNOSIS — M54.9 DORSALGIA, UNSPECIFIED: ICD-10-CM

## 2024-09-03 DIAGNOSIS — M53.82 OTHER SPECIFIED DORSOPATHIES, CERVICAL REGION: ICD-10-CM

## 2024-09-03 DIAGNOSIS — N62 HYPERTROPHY OF BREAST: Primary | ICD-10-CM

## 2024-09-03 DIAGNOSIS — L30.4 ERYTHEMA INTERTRIGO: ICD-10-CM

## 2024-09-03 DIAGNOSIS — M54.6 PAIN IN THORACIC SPINE: ICD-10-CM

## 2024-09-03 DIAGNOSIS — M54.2 CERVICALGIA: ICD-10-CM

## 2024-09-03 PROCEDURE — 88305 TISSUE EXAM BY PATHOLOGIST: CPT | Performed by: PATHOLOGY

## 2024-09-03 RX ORDER — LIDOCAINE HYDROCHLORIDE AND EPINEPHRINE 10; 10 MG/ML; UG/ML
INJECTION, SOLUTION INFILTRATION; PERINEURAL AS NEEDED
Status: DISCONTINUED | OUTPATIENT
Start: 2024-09-03 | End: 2024-09-03 | Stop reason: HOSPADM

## 2024-09-03 RX ORDER — SODIUM CHLORIDE 9 MG/ML
INJECTION INTRAVENOUS AS NEEDED
Status: DISCONTINUED | OUTPATIENT
Start: 2024-09-03 | End: 2024-09-03 | Stop reason: HOSPADM

## 2024-09-03 RX ORDER — SODIUM CHLORIDE, SODIUM LACTATE, POTASSIUM CHLORIDE, CALCIUM CHLORIDE 600; 310; 30; 20 MG/100ML; MG/100ML; MG/100ML; MG/100ML
INJECTION, SOLUTION INTRAVENOUS CONTINUOUS PRN
Status: DISCONTINUED | OUTPATIENT
Start: 2024-09-03 | End: 2024-09-03

## 2024-09-03 RX ORDER — ACETAMINOPHEN 10 MG/ML
INJECTION, SOLUTION INTRAVENOUS AS NEEDED
Status: DISCONTINUED | OUTPATIENT
Start: 2024-09-03 | End: 2024-09-03

## 2024-09-03 RX ORDER — LIDOCAINE HYDROCHLORIDE 10 MG/ML
INJECTION, SOLUTION EPIDURAL; INFILTRATION; INTRACAUDAL; PERINEURAL AS NEEDED
Status: DISCONTINUED | OUTPATIENT
Start: 2024-09-03 | End: 2024-09-03

## 2024-09-03 RX ORDER — TRAMADOL HYDROCHLORIDE 50 MG/1
50 TABLET ORAL EVERY 6 HOURS PRN
Qty: 30 TABLET | Refills: 0 | Status: SHIPPED | OUTPATIENT
Start: 2024-09-03 | End: 2024-09-13

## 2024-09-03 RX ORDER — NEOSTIGMINE METHYLSULFATE 1 MG/ML
INJECTION INTRAVENOUS AS NEEDED
Status: DISCONTINUED | OUTPATIENT
Start: 2024-09-03 | End: 2024-09-03

## 2024-09-03 RX ORDER — ROCURONIUM BROMIDE 10 MG/ML
INJECTION, SOLUTION INTRAVENOUS AS NEEDED
Status: DISCONTINUED | OUTPATIENT
Start: 2024-09-03 | End: 2024-09-03

## 2024-09-03 RX ORDER — MIDAZOLAM HYDROCHLORIDE 2 MG/2ML
INJECTION, SOLUTION INTRAMUSCULAR; INTRAVENOUS AS NEEDED
Status: DISCONTINUED | OUTPATIENT
Start: 2024-09-03 | End: 2024-09-03

## 2024-09-03 RX ORDER — TRAMADOL HYDROCHLORIDE 50 MG/1
50 TABLET ORAL EVERY 6 HOURS PRN
Status: DISCONTINUED | OUTPATIENT
Start: 2024-09-03 | End: 2024-09-03 | Stop reason: HOSPADM

## 2024-09-03 RX ORDER — PROPOFOL 10 MG/ML
INJECTION, EMULSION INTRAVENOUS CONTINUOUS PRN
Status: DISCONTINUED | OUTPATIENT
Start: 2024-09-03 | End: 2024-09-03

## 2024-09-03 RX ORDER — SODIUM CHLORIDE 9 MG/ML
125 INJECTION, SOLUTION INTRAVENOUS CONTINUOUS
Status: DISCONTINUED | OUTPATIENT
Start: 2024-09-03 | End: 2024-09-03 | Stop reason: HOSPADM

## 2024-09-03 RX ORDER — ONDANSETRON 2 MG/ML
INJECTION INTRAMUSCULAR; INTRAVENOUS AS NEEDED
Status: DISCONTINUED | OUTPATIENT
Start: 2024-09-03 | End: 2024-09-03

## 2024-09-03 RX ORDER — MAGNESIUM HYDROXIDE 1200 MG/15ML
LIQUID ORAL AS NEEDED
Status: DISCONTINUED | OUTPATIENT
Start: 2024-09-03 | End: 2024-09-03 | Stop reason: HOSPADM

## 2024-09-03 RX ORDER — SCOLOPAMINE TRANSDERMAL SYSTEM 1 MG/1
1 PATCH, EXTENDED RELEASE TRANSDERMAL ONCE AS NEEDED
Status: DISCONTINUED | OUTPATIENT
Start: 2024-09-03 | End: 2024-09-03 | Stop reason: HOSPADM

## 2024-09-03 RX ORDER — PROPOFOL 10 MG/ML
INJECTION, EMULSION INTRAVENOUS AS NEEDED
Status: DISCONTINUED | OUTPATIENT
Start: 2024-09-03 | End: 2024-09-03

## 2024-09-03 RX ORDER — GLYCOPYRROLATE 0.2 MG/ML
INJECTION INTRAMUSCULAR; INTRAVENOUS AS NEEDED
Status: DISCONTINUED | OUTPATIENT
Start: 2024-09-03 | End: 2024-09-03

## 2024-09-03 RX ORDER — ONDANSETRON 2 MG/ML
4 INJECTION INTRAMUSCULAR; INTRAVENOUS ONCE AS NEEDED
Status: DISCONTINUED | OUTPATIENT
Start: 2024-09-03 | End: 2024-09-03 | Stop reason: HOSPADM

## 2024-09-03 RX ORDER — HYDRALAZINE HYDROCHLORIDE 20 MG/ML
INJECTION INTRAMUSCULAR; INTRAVENOUS AS NEEDED
Status: DISCONTINUED | OUTPATIENT
Start: 2024-09-03 | End: 2024-09-03

## 2024-09-03 RX ORDER — ACETAMINOPHEN 10 MG/ML
1000 INJECTION, SOLUTION INTRAVENOUS EVERY 6 HOURS PRN
Status: DISCONTINUED | OUTPATIENT
Start: 2024-09-03 | End: 2024-09-03 | Stop reason: HOSPADM

## 2024-09-03 RX ORDER — FENTANYL CITRATE/PF 50 MCG/ML
25 SYRINGE (ML) INJECTION
Status: DISCONTINUED | OUTPATIENT
Start: 2024-09-03 | End: 2024-09-03 | Stop reason: HOSPADM

## 2024-09-03 RX ORDER — DEXAMETHASONE SODIUM PHOSPHATE 10 MG/ML
INJECTION, SOLUTION INTRAMUSCULAR; INTRAVENOUS AS NEEDED
Status: DISCONTINUED | OUTPATIENT
Start: 2024-09-03 | End: 2024-09-03

## 2024-09-03 RX ORDER — HYDROMORPHONE HCL/PF 1 MG/ML
SYRINGE (ML) INJECTION AS NEEDED
Status: DISCONTINUED | OUTPATIENT
Start: 2024-09-03 | End: 2024-09-03

## 2024-09-03 RX ORDER — FENTANYL CITRATE 50 UG/ML
INJECTION, SOLUTION INTRAMUSCULAR; INTRAVENOUS AS NEEDED
Status: DISCONTINUED | OUTPATIENT
Start: 2024-09-03 | End: 2024-09-03

## 2024-09-03 RX ORDER — CEFAZOLIN SODIUM 2 G/50ML
2000 SOLUTION INTRAVENOUS ONCE
Status: COMPLETED | OUTPATIENT
Start: 2024-09-03 | End: 2024-09-03

## 2024-09-03 RX ORDER — PHENYLEPHRINE HCL IN 0.9% NACL 1 MG/10 ML
SYRINGE (ML) INTRAVENOUS AS NEEDED
Status: DISCONTINUED | OUTPATIENT
Start: 2024-09-03 | End: 2024-09-03

## 2024-09-03 RX ORDER — CEPHALEXIN 500 MG/1
500 CAPSULE ORAL EVERY 8 HOURS SCHEDULED
Qty: 21 CAPSULE | Refills: 0 | Status: SHIPPED | OUTPATIENT
Start: 2024-09-03 | End: 2024-09-10

## 2024-09-03 RX ADMIN — HYDROMORPHONE HYDROCHLORIDE 0.5 MG: 1 INJECTION, SOLUTION INTRAMUSCULAR; INTRAVENOUS; SUBCUTANEOUS at 08:36

## 2024-09-03 RX ADMIN — ROCURONIUM BROMIDE 50 MG: 50 INJECTION, SOLUTION INTRAVENOUS at 07:42

## 2024-09-03 RX ADMIN — DEXAMETHASONE SODIUM PHOSPHATE 10 MG: 10 INJECTION, SOLUTION INTRAMUSCULAR; INTRAVENOUS at 08:18

## 2024-09-03 RX ADMIN — SODIUM CHLORIDE, SODIUM LACTATE, POTASSIUM CHLORIDE, AND CALCIUM CHLORIDE: .6; .31; .03; .02 INJECTION, SOLUTION INTRAVENOUS at 08:47

## 2024-09-03 RX ADMIN — ONDANSETRON 4 MG: 2 INJECTION INTRAMUSCULAR; INTRAVENOUS at 11:22

## 2024-09-03 RX ADMIN — HYDRALAZINE HYDROCHLORIDE 2 MG: 20 INJECTION INTRAMUSCULAR; INTRAVENOUS at 08:47

## 2024-09-03 RX ADMIN — SODIUM CHLORIDE, SODIUM LACTATE, POTASSIUM CHLORIDE, AND CALCIUM CHLORIDE: .6; .31; .03; .02 INJECTION, SOLUTION INTRAVENOUS at 11:36

## 2024-09-03 RX ADMIN — ACETAMINOPHEN 1000 MG: 10 INJECTION INTRAVENOUS at 10:43

## 2024-09-03 RX ADMIN — FENTANYL CITRATE 25 MCG: 50 INJECTION, SOLUTION INTRAMUSCULAR; INTRAVENOUS at 12:18

## 2024-09-03 RX ADMIN — PROPOFOL 200 MG: 10 INJECTION, EMULSION INTRAVENOUS at 07:42

## 2024-09-03 RX ADMIN — NEOSTIGMINE METHYLSULFATE 3 MG: 1 INJECTION INTRAVENOUS at 11:39

## 2024-09-03 RX ADMIN — TRAMADOL HYDROCHLORIDE 50 MG: 50 TABLET, COATED ORAL at 13:35

## 2024-09-03 RX ADMIN — PROPOFOL 120 MCG/KG/MIN: 10 INJECTION, EMULSION INTRAVENOUS at 07:51

## 2024-09-03 RX ADMIN — LIDOCAINE HYDROCHLORIDE 50 MG: 10 INJECTION, SOLUTION EPIDURAL; INFILTRATION; INTRACAUDAL; PERINEURAL at 07:42

## 2024-09-03 RX ADMIN — GLYCOPYRROLATE 0.4 MG: 0.2 INJECTION, SOLUTION INTRAMUSCULAR; INTRAVENOUS at 11:39

## 2024-09-03 RX ADMIN — FENTANYL CITRATE 100 MCG: 50 INJECTION, SOLUTION INTRAMUSCULAR; INTRAVENOUS at 07:42

## 2024-09-03 RX ADMIN — HYDRALAZINE HYDROCHLORIDE 2 MG: 20 INJECTION INTRAMUSCULAR; INTRAVENOUS at 08:51

## 2024-09-03 RX ADMIN — SODIUM CHLORIDE, SODIUM LACTATE, POTASSIUM CHLORIDE, AND CALCIUM CHLORIDE: .6; .31; .03; .02 INJECTION, SOLUTION INTRAVENOUS at 07:36

## 2024-09-03 RX ADMIN — HYDRALAZINE HYDROCHLORIDE 2 MG: 20 INJECTION INTRAMUSCULAR; INTRAVENOUS at 09:27

## 2024-09-03 RX ADMIN — CEFAZOLIN SODIUM 2000 MG: 2 SOLUTION INTRAVENOUS at 07:53

## 2024-09-03 RX ADMIN — SCOPOLAMINE 1 PATCH: 1.5 PATCH, EXTENDED RELEASE TRANSDERMAL at 07:24

## 2024-09-03 RX ADMIN — Medication 50 MCG: at 11:09

## 2024-09-03 RX ADMIN — MIDAZOLAM 2 MG: 1 INJECTION INTRAMUSCULAR; INTRAVENOUS at 07:36

## 2024-09-03 RX ADMIN — HYDROMORPHONE HYDROCHLORIDE 0.5 MG: 1 INJECTION, SOLUTION INTRAMUSCULAR; INTRAVENOUS; SUBCUTANEOUS at 08:44

## 2024-09-03 NOTE — ANESTHESIA POSTPROCEDURE EVALUATION
Post-Op Assessment Note    CV Status:  Stable    Pain management: adequate       Mental Status:  Alert and awake   Hydration Status:  Euvolemic   PONV Controlled:  Controlled   Airway Patency:  Patent     Post Op Vitals Reviewed: Yes    No anethesia notable event occurred.    Staff: NATALIE               /73 (09/03/24 1200)    Temp (!) 97 °F (36.1 °C) (09/03/24 1200)    Pulse 70 (09/03/24 1200)   Resp 16 (09/03/24 1200)    SpO2 99 % (09/03/24 1200)

## 2024-09-03 NOTE — INTERVAL H&P NOTE
H&P reviewed. After examining the patient I find no changes in the patients condition since the H&P had been written.    Vitals:    09/03/24 0647   BP: 156/81   Pulse: 57   Resp: 16   Temp: 97.9 °F (36.6 °C)   SpO2: 99%

## 2024-09-03 NOTE — NURSING NOTE
"States pain is 6-7 \"but is tolerable\" refused offer for further narcotics. Comfortable. Report called  "

## 2024-09-03 NOTE — DISCHARGE INSTR - AVS FIRST PAGE
THIS IS CONSIDERED MAJOR SURGERY. PLEASE ACT ACCORDINGLY. THE FOLLOWING INSTRUCTIONS ARE INTENDED AS A GUIDE FOR YOU TO FOLLOW AT HOME. THEY ARE NOT INTENDED AS A SUBSTITUTE FOR MEDICAL CARE.    AFTER SURGERY:    Following surgery before you're discharged from the short procedure unit the nurse will instruct you on how to activate your drains.  If you have EXCESSIVE DRAINAGE, meaning that the BULBS fill up twice in 1 hour, please call Dr. Dueñas for further instructions.  Your drains will be removed the day after surgery in Dr. Dueñas's office.    For your protection, we recommend that someone spend the first night with you because of medications prescribed and for assistance getting out of bed.  Leave your dressings in place; Dr. Dueñas will change them the following day when drains are removed.  After your first visit, no dressings are necessary.  You may shower after your drains are removed; wash gently with soap and water and pat dry. Put on a suze shirt/tank top then your bra on top of the shirt. NOTE: Shower only; do note use hot tub or baths until instructed. Do not use deodorant  until after the sutures are removed. I  Fill your prescriptions and taking medications as directed.  A very deep, sore muscular pain it is associated with this type of surgery and this is normal.  Pain medication will decrease the amount of pain that you have, but will not totally take it away, this is normal.  Do not drive while taking pain medication.  If you're more comfortable lying on your side this is permissible; however, do not sleep on your stomach for 2 weeks following surgery.  Any signs of bleeding or rash should be reported to the office.  If one breast becomes considerably larger was harder than the other, please call the office immediately.    FOLLOW-UP CARE:    It is recommended that you do not wear a bra for at least 3 weeks after surgery to allow the implants to drop down into the pocket.  Stitches will be  removed on the 10th postoperative day.  Never use a Heating Pad or Microwave Beads!  After stitches are removed it is important to use Scar Cream for approximately 3 months after surgery.      IF YOU HAVE ANY PROBLEMS OR QUESTIONS, PLEASE DO NOT HESITATE TO CALL THE OFFICE.    [phone]      Your first postoperative appointment will be tomorrow

## 2024-09-03 NOTE — OP NOTE
OPERATIVE REPORT  PATIENT NAME: Roz Gavin    :  1970  MRN: [id number]  Pt Location: [facility] OR ROOM 07    SURGERY DATE: 9/3/2024    Surgeons and Role:     * Emanuel Dueñas MD - Primary    Preop Diagnosis:  Hypertrophy of breast [N62]  Dorsalgia, unspecified [M54.9]  Pain in thoracic spine [M54.6]  Other specified dorsopathies, cervical region [M53.82]  Cervicalgia [M54.2]  Erythema intertrigo [L30.4]    Post-Op Diagnosis Codes:     * Hypertrophy of breast [N62]     * Dorsalgia, unspecified [M54.9]     * Pain in thoracic spine [M54.6]     * Other specified dorsopathies, cervical region [M53.82]     * Cervicalgia [M54.2]     * Erythema intertrigo [L30.4]    Procedure(s):  Bilateral - BREAST REDUCTION    Specimen(s):  ID Type Source Tests Collected by Time Destination   1 : LEFT BREAST REDUCTION TISSUE Tissue Breast, Left TISSUE EXAM Emanuel Dueñas MD 9/3/2024 0838    2 : RIGHT BREAST REDUCTION TISSUE Tissue Breast, Right TISSUE EXAM Emanuel Dueñas MD 9/3/2024 0838    3 : RIGHT AXILAARY LYMPH NODE Tissue Axillary lymph node TISSUE EXAM Emanuel Dueñas MD 9/3/2024 0859        Estimated Blood Loss:   Minimal    Drains:  Closed/Suction Drain Right;Lateral Breast Bulb 19 Fr. (Active)   Status To bulb suction 24 0833   Number of days: 0       Closed/Suction Drain Left;Lateral Breast Bulb 19 Fr. (Active)   Status To bulb suction 24 0833   Number of days: 0       Anesthesia Type:   General    Operative Indications:  Hypertrophy of breast [N62]  Dorsalgia, unspecified [M54.9]  Pain in thoracic spine [M54.6]  Other specified dorsopathies, cervical region [M53.82]  Cervicalgia [M54.2]  Erythema intertrigo [L30.4]      Operative Findings:  Excess breast tissue lymph node right breast axilla      Complications:   None    Procedure and Technique:  The patient was marked in the holding area for an inferior pedicle keyhole pattern reduction mammoplasty draped and prepped in the normal sterile fashion after  general endotracheal anesthesia.  The breast was injected with local anesthesia and tumescence and liposuction was used to contour the lateral portion of the breast.  The excess medial middle and lateral breast tissue was removed hemostasis achieved with electrocautery the wounds were irrigated and #19 channel drain was placed through separate stab wound in the flank and the dermis was approximated with 2-0 Vicryl suture the T incision closed with a running subcuticular 3-0 Prolene suture and the nipple closed with a running subcuticular 4-0 Vicryl suture identical procedure was performed on both sides the patient tolerated the procedure well   I was present for the entire procedure.    Patient Disposition:  PACU              SIGNATURE: Emanuel Dueñas MD  DATE: September 3, 2024  TIME: 11:24 AM

## 2024-09-03 NOTE — ANESTHESIA PREPROCEDURE EVALUATION
Procedure:  BREAST REDUCTION (Bilateral: Breast)    Relevant Problems   CARDIO   (+) Primary hypertension      ENDO   (+) Type 2 diabetes mellitus with obesity  (HCC)        Physical Exam    Airway    Mallampati score: II         Dental       Cardiovascular  Rhythm: regular, Rate: normal    Pulmonary   Breath sounds clear to auscultation    Other Findings  post-pubertal.      Anesthesia Plan  ASA Score- 2     Anesthesia Type- general with ASA Monitors.         Additional Monitors:     Airway Plan:            Plan Factors-Exercise tolerance (METS): >4 METS.            Patient is not a current smoker.      Obstructive sleep apnea risk education given perioperatively.        Induction- intravenous.    Postoperative Plan-     Perioperative Resuscitation Plan - Level 1 - Full Code.       Informed Consent- Anesthetic plan and risks discussed with patient.

## 2024-09-07 ENCOUNTER — HOSPITAL ENCOUNTER (EMERGENCY)
Facility: HOSPITAL | Age: 54
Discharge: HOME | End: 2024-09-07
Attending: FAMILY MEDICINE
Payer: COMMERCIAL

## 2024-09-07 ENCOUNTER — APPOINTMENT (EMERGENCY)
Dept: CT IMAGING | Facility: HOSPITAL | Age: 54
End: 2024-09-07
Payer: COMMERCIAL

## 2024-09-07 VITALS
HEIGHT: 64 IN | SYSTOLIC BLOOD PRESSURE: 142 MMHG | TEMPERATURE: 97.7 F | BODY MASS INDEX: 31.41 KG/M2 | WEIGHT: 184 LBS | HEART RATE: 62 BPM | DIASTOLIC BLOOD PRESSURE: 66 MMHG | OXYGEN SATURATION: 100 % | RESPIRATION RATE: 18 BRPM

## 2024-09-07 DIAGNOSIS — R55 VASOVAGAL SYNCOPE: Primary | ICD-10-CM

## 2024-09-07 DIAGNOSIS — E16.2 HYPOGLYCEMIA: ICD-10-CM

## 2024-09-07 LAB
ANION GAP SERPL CALCULATED.3IONS-SCNC: 10 MMOL/L (ref 4–13)
BASOPHILS # BLD AUTO: 0.06 THOUSANDS/ÂΜL (ref 0–0.1)
BASOPHILS NFR BLD AUTO: 1 % (ref 0–1)
BUN SERPL-MCNC: 17 MG/DL (ref 5–25)
CALCIUM SERPL-MCNC: 9.4 MG/DL (ref 8.4–10.2)
CHLORIDE SERPL-SCNC: 102 MMOL/L (ref 96–108)
CO2 SERPL-SCNC: 24 MMOL/L (ref 21–32)
CREAT SERPL-MCNC: 0.84 MG/DL (ref 0.6–1.3)
D DIMER PPP FEU-MCNC: 0.73 UG/ML FEU
EOSINOPHIL # BLD AUTO: 0.06 THOUSAND/ÂΜL (ref 0–0.61)
EOSINOPHIL NFR BLD AUTO: 1 % (ref 0–6)
ERYTHROCYTE [DISTWIDTH] IN BLOOD BY AUTOMATED COUNT: 13.1 % (ref 11.6–15.1)
FLUAV RNA RESP QL NAA+PROBE: NEGATIVE
FLUBV RNA RESP QL NAA+PROBE: NEGATIVE
GFR SERPL CREATININE-BSD FRML MDRD: 79 ML/MIN/1.73SQ M
GLUCOSE SERPL-MCNC: 187 MG/DL (ref 65–140)
HCT VFR BLD AUTO: 34.1 % (ref 34.8–46.1)
HGB BLD-MCNC: 11.2 G/DL (ref 11.5–15.4)
IMM GRANULOCYTES # BLD AUTO: 0.01 THOUSAND/UL (ref 0–0.2)
IMM GRANULOCYTES NFR BLD AUTO: 0 % (ref 0–2)
LYMPHOCYTES # BLD AUTO: 1.7 THOUSANDS/ÂΜL (ref 0.6–4.47)
LYMPHOCYTES NFR BLD AUTO: 22 % (ref 14–44)
MAGNESIUM SERPL-MCNC: 1.9 MG/DL (ref 1.9–2.7)
MCH RBC QN AUTO: 31.1 PG (ref 26.8–34.3)
MCHC RBC AUTO-ENTMCNC: 32.8 G/DL (ref 31.4–37.4)
MCV RBC AUTO: 95 FL (ref 82–98)
MONOCYTES # BLD AUTO: 0.55 THOUSAND/ÂΜL (ref 0.17–1.22)
MONOCYTES NFR BLD AUTO: 7 % (ref 4–12)
NEUTROPHILS # BLD AUTO: 5.31 THOUSANDS/ÂΜL (ref 1.85–7.62)
NEUTS SEG NFR BLD AUTO: 69 % (ref 43–75)
NRBC BLD AUTO-RTO: 0 /100 WBCS
PLATELET # BLD AUTO: 219 THOUSANDS/UL (ref 149–390)
PMV BLD AUTO: 9.5 FL (ref 8.9–12.7)
POTASSIUM SERPL-SCNC: 3.9 MMOL/L (ref 3.5–5.3)
RBC # BLD AUTO: 3.6 MILLION/UL (ref 3.81–5.12)
RSV RNA RESP QL NAA+PROBE: NEGATIVE
SARS-COV-2 RNA RESP QL NAA+PROBE: NEGATIVE
SODIUM SERPL-SCNC: 136 MMOL/L (ref 135–147)
WBC # BLD AUTO: 7.69 THOUSAND/UL (ref 4.31–10.16)

## 2024-09-07 PROCEDURE — 80048 BASIC METABOLIC PNL TOTAL CA: CPT | Performed by: FAMILY MEDICINE

## 2024-09-07 PROCEDURE — 71275 CT ANGIOGRAPHY CHEST: CPT

## 2024-09-07 PROCEDURE — 83735 ASSAY OF MAGNESIUM: CPT | Performed by: FAMILY MEDICINE

## 2024-09-07 PROCEDURE — 99285 EMERGENCY DEPT VISIT HI MDM: CPT

## 2024-09-07 PROCEDURE — 36415 COLL VENOUS BLD VENIPUNCTURE: CPT | Performed by: FAMILY MEDICINE

## 2024-09-07 PROCEDURE — 0241U HB NFCT DS VIR RESP RNA 4 TRGT: CPT | Performed by: FAMILY MEDICINE

## 2024-09-07 PROCEDURE — 96360 HYDRATION IV INFUSION INIT: CPT

## 2024-09-07 PROCEDURE — 96361 HYDRATE IV INFUSION ADD-ON: CPT

## 2024-09-07 PROCEDURE — 99285 EMERGENCY DEPT VISIT HI MDM: CPT | Performed by: FAMILY MEDICINE

## 2024-09-07 PROCEDURE — 85379 FIBRIN DEGRADATION QUANT: CPT | Performed by: FAMILY MEDICINE

## 2024-09-07 PROCEDURE — 85025 COMPLETE CBC W/AUTO DIFF WBC: CPT | Performed by: FAMILY MEDICINE

## 2024-09-07 PROCEDURE — 93005 ELECTROCARDIOGRAM TRACING: CPT

## 2024-09-07 RX ADMIN — SODIUM CHLORIDE 1000 ML: 0.9 INJECTION, SOLUTION INTRAVENOUS at 14:32

## 2024-09-07 RX ADMIN — IOHEXOL 85 ML: 350 INJECTION, SOLUTION INTRAVENOUS at 15:26

## 2024-09-07 NOTE — ED PROVIDER NOTES
"History  Chief Complaint   Patient presents with    Loss of Consciousness     Pt reports feeling dizzy and then \"everything turned white and then I was on the ground\".  She has hx of recent breast reduction surgery and gastric sleeve.  She reports eating a banana as she stated she has not eaten since yesterday.     HPI  53-year-old female recent history of a breast reduction last Tuesday presented to ED with the complaint of a syncopal episode at home.  Patient that she was standing talking to her  and her old box when felt that she was going to pass out and noticed that everything was white patient states she lowered self to the ground did not hit her head.   stated that she came right back within few seconds she asked her  to give her a banana which she ate and felt better.  She states that she did not have breakfast today and is unsure if her blood sugar was low.  Patient denies any headache blurry vision double vision.  Patient denying chest pain shortness of breath.  Awake alert oriented GCS 15  Prior to Admission Medications   Prescriptions Last Dose Informant Patient Reported? Taking?   Calcium Carbonate 500 (200 Ca) MG WAFR   Yes No   Sig: Take by mouth daily   Calcium Polycarbophil (FIBER-CAPS PO)   Yes No   Sig: Take by mouth daily   candesartan (ATACAND) 32 MG tablet  Self Yes No   Sig: Take 32 mg by mouth daily as needed   cephalexin (KEFLEX) 500 mg capsule   No No   Sig: Take 1 capsule (500 mg total) by mouth every 8 (eight) hours for 7 days   omeprazole (PriLOSEC OTC) 20 MG tablet   Yes No   Sig: Take 20 mg by mouth if needed   rosuvastatin (CRESTOR) 5 mg tablet Not Taking  Yes No   Sig: Take 5 mg by mouth daily   Patient not taking: Reported on 9/7/2024   spironolactone (ALDACTONE) 25 mg tablet  Self Yes No   Sig: Take 25 mg by mouth 2 (two) times a day   traMADol (ULTRAM) 50 mg tablet Not Taking  No No   Sig: Take 1 tablet (50 mg total) by mouth every 6 (six) hours as needed for " moderate pain for up to 10 days   Patient not taking: Reported on 9/7/2024      Facility-Administered Medications: None       Past Medical History:   Diagnosis Date    Anesthesia complication     difficulty awakening    Arthritis     enrique    COVID     2022    Diabetes mellitus (HCC)     diet controlled    GERD (gastroesophageal reflux disease)     History of bariatric surgery     Hypertension        Past Surgical History:   Procedure Laterality Date    CARPAL TUNNEL RELEASE Bilateral     CHOLECYSTECTOMY      COLONOSCOPY      GASTRECTOMY SLEEVE LAPAROSCOPIC      KNEE SURGERY Bilateral     meniscal repair    LAPAROSCOPIC GASTRIC BANDING      OTHER SURGICAL HISTORY Left     cubital tunnel release    PANNICULECTOMY      DC BREAST REDUCTION Bilateral 9/3/2024    Procedure: BREAST REDUCTION;  Surgeon: Emanuel Dueñas MD;  Location:  MAIN OR;  Service: Plastics    REMOVAL GASTRIC BAND LAPAROSCOPIC      SHOULDER SURGERY Right 07/20/2022       Family History   Problem Relation Age of Onset    Cervical cancer Mother 68    Cancer Mother         Cervical n preventable    COPD Father     No Known Problems Sister     Asthma Sister     Stroke Maternal Grandmother     Arthritis Maternal Grandmother     No Known Problems Maternal Grandfather     No Known Problems Paternal Grandmother     No Known Problems Paternal Grandfather     Learning disabilities Paternal Aunt     Mental illness Paternal Aunt     Heart disease Neg Hx      I have reviewed and agree with the history as documented.    E-Cigarette/Vaping    E-Cigarette Use Never User      E-Cigarette/Vaping Substances     Social History     Tobacco Use    Smoking status: Never    Smokeless tobacco: Never   Vaping Use    Vaping status: Never Used   Substance Use Topics    Alcohol use: Yes     Alcohol/week: 1.0 standard drink of alcohol     Types: 1 Cans of beer per week     Comment: rarely    Drug use: Yes     Types: Marijuana     Comment: Infrequently n socially       Review of  Systems   Constitutional:  Negative for chills and fever.   HENT:  Negative for rhinorrhea and sore throat.    Eyes:  Negative for visual disturbance.   Respiratory:  Negative for cough and shortness of breath.    Cardiovascular:  Negative for chest pain and leg swelling.   Gastrointestinal:  Negative for abdominal pain, diarrhea, nausea and vomiting.   Genitourinary:  Negative for dysuria.   Musculoskeletal:  Negative for back pain and myalgias.   Skin:  Negative for rash.   Neurological:  Positive for syncope. Negative for dizziness and headaches.   Psychiatric/Behavioral:  Negative for confusion.    All other systems reviewed and are negative.      Physical Exam  Physical Exam  Vitals and nursing note reviewed.   Constitutional:       Appearance: She is well-developed.   HENT:      Head: Normocephalic and atraumatic.      Right Ear: External ear normal.      Left Ear: External ear normal.      Nose: Nose normal.      Mouth/Throat:      Mouth: Mucous membranes are moist.      Pharynx: No oropharyngeal exudate.   Eyes:      General: No scleral icterus.        Right eye: No discharge.         Left eye: No discharge.      Conjunctiva/sclera: Conjunctivae normal.      Pupils: Pupils are equal, round, and reactive to light.   Cardiovascular:      Rate and Rhythm: Normal rate and regular rhythm.      Pulses: Normal pulses.      Heart sounds: Normal heart sounds.   Pulmonary:      Effort: Pulmonary effort is normal. No respiratory distress.      Breath sounds: Normal breath sounds. No wheezing.   Chest:      Comments: Status post breast reduction suture in place no signs of infection no drainage noted no redness noted.  Bruising noted after the procedure  Abdominal:      General: Bowel sounds are normal.      Palpations: Abdomen is soft.   Musculoskeletal:         General: Normal range of motion.      Cervical back: Normal range of motion and neck supple.   Lymphadenopathy:      Cervical: No cervical adenopathy.    Skin:     General: Skin is warm and dry.      Capillary Refill: Capillary refill takes less than 2 seconds.   Neurological:      General: No focal deficit present.      Mental Status: She is alert and oriented to person, place, and time.   Psychiatric:         Mood and Affect: Mood normal.         Behavior: Behavior normal.         Vital Signs  ED Triage Vitals   Temperature Pulse Respirations Blood Pressure SpO2   09/07/24 1410 09/07/24 1444 09/07/24 1410 09/07/24 1410 09/07/24 1444   97.7 °F (36.5 °C) 63 18 (!) 173/71 98 %      Temp Source Heart Rate Source Patient Position - Orthostatic VS BP Location FiO2 (%)   09/07/24 1410 09/07/24 1500 09/07/24 1410 09/07/24 1410 --   Temporal Monitor Sitting Left arm       Pain Score       --                  Vitals:    09/07/24 1410 09/07/24 1444 09/07/24 1500   BP: (!) 173/71 141/62 142/66   Pulse:  63 62   Patient Position - Orthostatic VS: Sitting  Sitting         Visual Acuity      ED Medications  Medications   sodium chloride 0.9 % bolus 1,000 mL (1,000 mL Intravenous New Bag 9/7/24 1432)   iohexol (OMNIPAQUE) 350 MG/ML injection (MULTI-DOSE) 100 mL (85 mL Intravenous Given 9/7/24 1526)       Diagnostic Studies  Results Reviewed       Procedure Component Value Units Date/Time    FLU/RSV/COVID - if FLU/RSV clinically relevant [[id number]]  (Normal) Collected: 09/07/24 1430    Lab Status: Final result Specimen: Nares from Nose Updated: 09/07/24 1536     SARS-CoV-2 Negative     INFLUENZA A PCR Negative     INFLUENZA B PCR Negative     RSV PCR Negative    Narrative:      This test has been performed using the CoV-2/Flu/RSV plus assay on the [employer] GeneXpert platform. This test has been validated by the  and verified by the performing laboratory.     This test is designed to amplify and detect the following: nucleocapsid (N), envelope (E), and RNA-dependent RNA polymerase (RdRP) genes of the SARS-CoV-2 genome; matrix (M), basic polymerase (PB2), and acidic  protein (PA) segments of the influenza A genome; matrix (M) and non-structural protein (NS) segments of the influenza B genome, and the nucleocapsid genes of RSV A and RSV B.     Positive results are indicative of the presence of Flu A, Flu B, RSV, and/or SARS-CoV-2 RNA. Positive results for SARS-CoV-2 or suspected novel influenza should be reported to state, local, or federal health departments according to local reporting requirements.      All results should be assessed in conjunction with clinical presentation and other laboratory markers for clinical management.     FOR PEDIATRIC PATIENTS - copy/paste COVID Guidelines URL to browser: [email or web address]       D-Dimer [[id number]]  (Abnormal) Collected: 09/07/24 1430    Lab Status: Final result Specimen: Blood from Arm, Right Updated: 09/07/24 1459     D-Dimer, Quant 0.73 ug/ml FEU     Narrative:      In the evaluation for possible pulmonary embolism, in the appropriate (Well's Score of 4 or less) patient, the age adjusted d-dimer cutoff for this patient can be calculated as:    Age x 0.01 (in ug/mL) for Age-adjusted D-dimer exclusion threshold for a patient over 50 years.    Basic metabolic panel [[id number]]  (Abnormal) Collected: 09/07/24 1430    Lab Status: Final result Specimen: Blood from Arm, Right Updated: 09/07/24 1457     Sodium 136 mmol/L      Potassium 3.9 mmol/L      Chloride 102 mmol/L      CO2 24 mmol/L      ANION GAP 10 mmol/L      BUN 17 mg/dL      Creatinine 0.84 mg/dL      Glucose 187 mg/dL      Calcium 9.4 mg/dL      eGFR 79 ml/min/1.73sq m     Narrative:      National Kid[employer] Foundation guidelines for Chronic Kidney Disease (CKD):     Stage 1 with normal or high GFR (GFR > 90 mL/min/1.73 square meters)    Stage 2 Mild CKD (GFR = 60-89 mL/min/1.73 square meters)    Stage 3A Moderate CKD (GFR = 45-59 mL/min/1.73 square meters)    Stage 3B Moderate CKD (GFR = 30-44 mL/min/1.73 square  meters)    Stage 4 Severe CKD (GFR = 15-29 mL/min/1.73 square meters)    Stage 5 End Stage CKD (GFR <15 mL/min/1.73 square meters)  Note: GFR calculation is accurate only with a steady state creatinine    Magnesium [[id number]]  (Normal) Collected: 09/07/24 1430    Lab Status: Final result Specimen: Blood from Arm, Right Updated: 09/07/24 1457     Magnesium 1.9 mg/dL     CBC and differential [[id number]]  (Abnormal) Collected: 09/07/24 1430    Lab Status: Final result Specimen: Blood from Arm, Right Updated: 09/07/24 1438     WBC 7.69 Thousand/uL      RBC 3.60 Million/uL      Hemoglobin 11.2 g/dL      Hematocrit 34.1 %      MCV 95 fL      MCH 31.1 pg      MCHC 32.8 g/dL      RDW 13.1 %      MPV 9.5 fL      Platelets 219 Thousands/uL      nRBC 0 /100 WBCs      Segmented % 69 %      Immature Grans % 0 %      Lymphocytes % 22 %      Monocytes % 7 %      Eosinophils Relative 1 %      Basophils Relative 1 %      Absolute Neutrophils 5.31 Thousands/µL      Absolute Immature Grans 0.01 Thousand/uL      Absolute Lymphocytes 1.70 Thousands/µL      Absolute Monocytes 0.55 Thousand/µL      Eosinophils Absolute 0.06 Thousand/µL      Basophils Absolute 0.06 Thousands/µL     UA w Reflex to Microscopic w Reflex to Culture [[id number]]     Lab Status: No result Specimen: Urine                    CTA ED chest PE Study   Final Result by Adrianne Gonzalez MD (09/07 1550)      No pulmonary embolus.      No acute pulmonary disease.      Trace pericardial effusion.      Moderate coronary artery calcification indicating atherosclerotic heart disease.            Workstation performed: [id number]                    Procedures  Procedures         ED Course  ED Course as of 09/07/24 1608   Sat Sep 07, 2024   1604 No pulmonary embolus.     No acute pulmonary disease.     Trace pericardial effusion.     Moderate coronary artery calcification indicating atherosclerotic heart disease.                                      SBIRT 20yo+      Flowsheet  Row Most Recent Value   Initial Alcohol Screen: US AUDIT-C     1. How often do you have a drink containing alcohol? 0 Filed at: 09/07/2024 1415   2. How many drinks containing alcohol do you have on a typical day you are drinking?  0 Filed at: 09/07/2024 1415   3b. FEMALE Any Age, or MALE 65+: How often do you have 4 or more drinks on one occassion? 0 Filed at: 09/07/2024 1415   Audit-C Score 0 Filed at: 09/07/2024 1415   BK: How many times in the past year have you...    Used an illegal drug or used a prescription medication for non-medical reasons? Never Filed at: 09/07/2024 1415                      Medical Decision Making  53-year-old female recent history of a breast reduction last Tuesday presented to ED with the complaint of a syncopal episode at home.  Patient that she was standing talking to her  and her old box when felt that she was going to pass out and noticed that everything was white patient states she lowered self to the ground did not hit her head.   stated that she came right back within few seconds she asked her  to give her a banana which she ate and felt better.  She states that she did not have breakfast today and is unsure if her blood sugar was low.  Patient denies any headache blurry vision double vision.  Patient denying chest pain shortness of breath.  Awake alert oriented GCS 15.  Differential diagnoses include hypoglycemia/vasovagal syncope/PE/seizure failure  Plan will obtain lab CBC BMP patient is given IV fluid  Lab reviewed which shows slightly elevated D-dimer,CTA chest   CTA chest reviewed within normal limits.  Symptoms most likely secondary to hypoglycemia I discussed with patient patient is in agreement.  Disposition Case discussed with the patient and  at bedside feel comfortable going home recommend he continue to check her blood sugar every noticed any worsening symptoms immediately return back to the ED.  Patient verbalized understand the plan  discharge home.    Amount and/or Complexity of Data Reviewed  Labs: ordered.  Radiology: ordered.    Risk  Prescription drug management.                 Disposition  Final diagnoses:   Vasovagal syncope   Hypoglycemia     Time reflects when diagnosis was documented in both MDM as applicable and the Disposition within this note       Time User Action Codes Description Comment    9/7/2024  4:05 PM Ahmad Dallin Add [R55] Vasovagal syncope     9/7/2024  4:06 PM Ahmagladys Dallin Add [E16.2] Hypoglycemia           ED Disposition       ED Disposition   Discharge    Condition   Stable    Date/Time   Sat Sep 7, 2024 1605    Comment   Roz Romaine discharge to home/self care.                   Follow-up Information       Follow up With Specialties Details Why Contact Info    Atiya Gage MD Family Medicine Call today  [address]  [address] [place] [id number] [phone]              Current Discharge Medication List        CONTINUE these medications which have NOT CHANGED    Details   Calcium Carbonate 500 (200 Ca) MG WAFR Take by mouth daily      Calcium Polycarbophil (FIBER-CAPS PO) Take by mouth daily      candesartan (ATACAND) 32 MG tablet Take 32 mg by mouth daily as needed      cephalexin (KEFLEX) 500 mg capsule Take 1 capsule (500 mg total) by mouth every 8 (eight) hours for 7 days  Qty: 21 capsule, Refills: 0    Associated Diagnoses: Hypertrophy of breast      omeprazole (PriLOSEC OTC) 20 MG tablet Take 20 mg by mouth if needed      rosuvastatin (CRESTOR) 5 mg tablet Take 5 mg by mouth daily      spironolactone (ALDACTONE) 25 mg tablet Take 25 mg by mouth 2 (two) times a day      traMADol (ULTRAM) 50 mg tablet Take 1 tablet (50 mg total) by mouth every 6 (six) hours as needed for moderate pain for up to 10 days  Qty: 30 tablet, Refills: 0    Associated Diagnoses: Hypertrophy of breast             No discharge procedures on file.    PDMP Review       None             ED Provider  Electronically Signed by             Dallin Gaffney MD  09/07/24 7211       Dallin Gaffney MD  09/07/24 6201

## 2024-09-08 LAB
ATRIAL RATE: 63 BPM
P AXIS: 51 DEGREES
PR INTERVAL: 156 MS
QRS AXIS: 70 DEGREES
QRSD INTERVAL: 82 MS
QT INTERVAL: 402 MS
QTC INTERVAL: 411 MS
T WAVE AXIS: 48 DEGREES
VENTRICULAR RATE: 63 BPM

## 2024-09-08 PROCEDURE — 93010 ELECTROCARDIOGRAM REPORT: CPT | Performed by: INTERNAL MEDICINE

## 2024-09-09 PROCEDURE — 88305 TISSUE EXAM BY PATHOLOGIST: CPT | Performed by: PATHOLOGY

## 2024-10-20 LAB
ALBUMIN SERPL-MCNC: 4.5 G/DL (ref 3.6–5.1)
ALBUMIN/GLOB SERPL: 1.6 (CALC) (ref 1–2.5)
ALP SERPL-CCNC: 59 U/L (ref 37–153)
ALT SERPL-CCNC: 12 U/L (ref 6–29)
AST SERPL-CCNC: 24 U/L (ref 10–35)
BILIRUB SERPL-MCNC: 0.6 MG/DL (ref 0.2–1.2)
BUN SERPL-MCNC: 18 MG/DL (ref 7–25)
BUN/CREAT SERPL: NORMAL (CALC) (ref 6–22)
CALCIUM SERPL-MCNC: 9.7 MG/DL (ref 8.6–10.4)
CALCIUM SERPL-MCNC: 9.7 MG/DL (ref 8.6–10.4)
CHLORIDE SERPL-SCNC: 104 MMOL/L (ref 98–110)
CO2 SERPL-SCNC: 26 MMOL/L (ref 20–32)
CREAT SERPL-MCNC: 0.82 MG/DL (ref 0.5–1.03)
GFR/BSA.PRED SERPLBLD CYS-BASED-ARV: 85 ML/MIN/1.73M2
GLOBULIN SER CALC-MCNC: 2.8 G/DL (CALC) (ref 1.9–3.7)
GLUCOSE SERPL-MCNC: 94 MG/DL (ref 65–99)
POTASSIUM SERPL-SCNC: 4.5 MMOL/L (ref 3.5–5.3)
PROT SERPL-MCNC: 7.3 G/DL (ref 6.1–8.1)
PTH-INTACT SERPL-MCNC: 26 PG/ML (ref 16–77)
SODIUM SERPL-SCNC: 138 MMOL/L (ref 135–146)
VIT A SERPL-MCNC: 38 MCG/DL (ref 38–98)
VIT B1 BLD-SCNC: 149 NMOL/L (ref 78–185)
ZINC SERPL-MCNC: 114 MCG/DL (ref 60–130)

## 2024-10-30 ENCOUNTER — CLINICAL SUPPORT (OUTPATIENT)
Dept: BARIATRICS | Facility: CLINIC | Age: 54
End: 2024-10-30

## 2024-10-30 VITALS — WEIGHT: 180.7 LBS | BODY MASS INDEX: 31.02 KG/M2

## 2024-10-30 VITALS — BODY MASS INDEX: 30.85 KG/M2 | WEIGHT: 180.7 LBS | HEIGHT: 64 IN

## 2024-10-30 DIAGNOSIS — R63.5 ABNORMAL WEIGHT GAIN: ICD-10-CM

## 2024-10-30 DIAGNOSIS — I10 PRIMARY HYPERTENSION: ICD-10-CM

## 2024-10-30 DIAGNOSIS — E11.69 TYPE 2 DIABETES MELLITUS WITH OBESITY  (HCC): Primary | ICD-10-CM

## 2024-10-30 DIAGNOSIS — E66.9 TYPE 2 DIABETES MELLITUS WITH OBESITY  (HCC): ICD-10-CM

## 2024-10-30 DIAGNOSIS — E66.9 TYPE 2 DIABETES MELLITUS WITH OBESITY  (HCC): Primary | ICD-10-CM

## 2024-10-30 DIAGNOSIS — Z98.84 BARIATRIC SURGERY STATUS: ICD-10-CM

## 2024-10-30 DIAGNOSIS — E66.811 OBESITY, CLASS I, BMI 30-34.9: ICD-10-CM

## 2024-10-30 DIAGNOSIS — E66.01 MORBID OBESITY (HCC): Primary | ICD-10-CM

## 2024-10-30 DIAGNOSIS — E11.69 TYPE 2 DIABETES MELLITUS WITH OBESITY  (HCC): ICD-10-CM

## 2024-10-30 PROCEDURE — WEIGHT: Performed by: DIETITIAN, REGISTERED

## 2024-10-30 PROCEDURE — RECHECK: Performed by: DIETITIAN, REGISTERED

## 2024-10-30 PROCEDURE — WMDI30: Performed by: DIETITIAN, REGISTERED

## 2024-10-30 NOTE — PROGRESS NOTES
Bariatric Follow Up Nutrition Note    Type of surgery  adjustable gastric banding at [facility]  Surgery Date: 12/2006  18 years post-op  Surgeon: Dr. Anabela Smith    Lap Band Removal at [facility]  Surgery Date: 11/15/2023  1 year post-op  Surgeon: Oliverio Escalante     Vertical sleeve gastrectomy at [facility]  Surgery Date: 2/5/2024  9 months post-op  Surgeon: Oliverio Escalante     9/03/2024: B/L Breast reduction    Provided SECA body composition test today as well  FWB=[id number]    Nutrition Assessment   Roz Florentinoas  53 y.o.  female  Wt 82 kg (180 lb 11.2 oz)   BMI 31.02 kg/m²   Wt Readings from Last 10 Encounters:   10/30/24 82 kg (180 lb 11.2 oz)   10/30/24 82 kg (180 lb 11.2 oz)   09/07/24 83.5 kg (184 lb)   09/03/24 83.5 kg (184 lb)   07/10/24 86 kg (189 lb 8 oz)   03/15/24 97.3 kg (214 lb 9.6 oz)   12/12/23 110 kg (242 lb 6.4 oz)   03/02/23 113 kg (249 lb 1.9 oz)   08/09/22 113 kg (250 lb)   12/23/21 113 kg (250 lb)     Gregg Warren Equation:    BMR= 1463kcal  Estimated calories for weight maintenance:  1755kcal  (sedentary)  Estimated calories for weight loss 1255kcal (1# per wk wt loss - sedentary)  Estimated protein needs 68.3-102.4g (1.0-1.5 gms/kg IBW)  Estimated fluid needs 2049-2391ml (30-35 ml/kg IBW)    12/2006: Weight on Day of Lap Band Surgery: 308lbs  11/2023: Weight on Day of Lap Band Removal Surgery: 239lbs  2/2024: Weight on Day of Sleeve Gastrectomy Surgery: 230lbs  Weight in (lb) to have BMI = 25: 150.25lbs  Pre-Op Excess Wt: 157.75lbs  Post-Op Anthony: current weight  Post-Op Wt Loss: 127.3#/ 80.7% EBWL/41.33% TBWL  in 18 year(s)    Review of History and Medications   Past Medical History:   Diagnosis Date    Anesthesia complication     difficulty awakening    Arthritis     enrique    COVID     2022    Diabetes mellitus (HCC)     diet controlled    GERD (gastroesophageal reflux disease)     History of bariatric surgery     Hypertension      Past Surgical History:    Procedure Laterality Date    CARPAL TUNNEL RELEASE Bilateral     CHOLECYSTECTOMY      COLONOSCOPY      GASTRECTOMY SLEEVE LAPAROSCOPIC      KNEE SURGERY Bilateral     meniscal repair    LAPAROSCOPIC GASTRIC BANDING      OTHER SURGICAL HISTORY Left     cubital tunnel release    PANNICULECTOMY      CT BREAST REDUCTION Bilateral 9/3/2024    Procedure: BREAST REDUCTION;  Surgeon: Emanuel Dueñas MD;  Location:  MAIN OR;  Service: Plastics    REMOVAL GASTRIC BAND LAPAROSCOPIC      SHOULDER SURGERY Right 07/20/2022     Social History     Socioeconomic History    Marital status: /Civil Union     Spouse name: Not on file    Number of children: Not on file    Years of education: Not on file    Highest education level: Not on file   Occupational History    Not on file   Tobacco Use    Smoking status: Never    Smokeless tobacco: Never   Vaping Use    Vaping status: Never Used   Substance and Sexual Activity    Alcohol use: Yes     Alcohol/week: 1.0 standard drink of alcohol     Types: 1 Cans of beer per week     Comment: rarely    Drug use: Yes     Types: Marijuana     Comment: Infrequently n socially    Sexual activity: Yes     Partners: Male     Birth control/protection: Post-menopausal   Other Topics Concern    Not on file   Social History Narrative    Not on file     Social Determinants of Health     Financial Resource Strain: Low Risk  (4/24/2024)    Received from [facility]    Overall Financial Resource Strain (CARDIA)     Difficulty of Paying Living Expenses: Not very hard   Food Insecurity: Food Insecurity Present (4/24/2024)    Received from [facility]    Hunger Vital Sign     Worried About Running Out of Food in the Last Year: Never true     Ran Out of Food in the Last Year: Sometimes true   Transportation Needs: No Transportation Needs (4/24/2024)    Received from [facility]    PRAPARE - Transportation     Lack of Transportation (Medical): No     Lack  of Transportation (Non-Medical): No   Physical Activity: Not on file   Stress: No Stress Concern Present (4/24/2024)    Received from [facility]    [place] [employer] - Occupational Stress Questionnaire     Feeling of Stress : Only a little   Social Connections: Feeling Socially Integrated (4/24/2024)    Received from [facility]    OASIS : Social Isolation     How often do you feel lonely or isolated from those around you?: Rarely   Intimate Partner Violence: Not At Risk (4/24/2024)    Received from [facility]    Humiliation, Afraid, Rape, and Kick questionnaire     Fear of Current or Ex-Partner: No     Emotionally Abused: No     Physically Abused: No     Sexually Abused: No   Housing Stability: Unknown (4/24/2024)    Received from [facility]    Housing Stability Vital Sign     Unable to Pay for Housing in the Last Year: No     Number of Times Moved in the Last Year: Not on file     Homeless in the Last Year: No       Current Outpatient Medications:     Calcium Carbonate 500 (200 Ca) MG WAFR, Take by mouth daily, Disp: , Rfl:     Calcium Polycarbophil (FIBER-CAPS PO), Take by mouth daily, Disp: , Rfl:     candesartan (ATACAND) 32 MG tablet, Take 32 mg by mouth daily as needed, Disp: , Rfl:     omeprazole (PriLOSEC OTC) 20 MG tablet, Take 20 mg by mouth if needed, Disp: , Rfl:     rosuvastatin (CRESTOR) 5 mg tablet, Take 5 mg by mouth daily (Patient not taking: Reported on 9/7/2024), Disp: , Rfl:     spironolactone (ALDACTONE) 25 mg tablet, Take 25 mg by mouth 2 (two) times a day, Disp: , Rfl:     Food Intake and Lifestyle Assessment   Food Intake Assessment completed via usual diet recall  Wakes 11am  Breakfast: 11:30am: one egg (7g pro), spinach, peppers, onions, two morning star veggie sausage (9g pro)  Snack: cheese stick or fruit   Lunch: 4 oz lean protein, vegetables  Snack: cheese stick or fruit  Dinner: measures  out 4 oz lean protein- either chicken or 93/76 ground meats, vegetables  Snack: none  Beverage intake: water and diet snapple, flavor packets  Diet texture/stage: regular  Protein supplement: not currently-getting >60g with her foods  Estimated protein intake per day: >60g  Estimated fluid intake per day: >64oz  Meals eaten away from home: maybe twice a week- orders a lean protien and a salad- can last her up to 3 meals.  Typical meal pattern: 3 meals per day and 2 snacks per day  Eating Behaviors: Appropriate diet advancement, Appropriate portion sizes, and Does not drink with meals and waits 30-minutes after meal before resuming drinking  Pt reports some struggle with mindless eating/picking/snacking  Food allergies or intolerances: FA  Cultural or Faith considerations: none noted  Loves fruit    Vitamin and Mineral regimen: Centrum MVI, was taking sublingual B12    Physical Assessment  Nutrition Related Findings  Pt denies any nausea, vomiting, diarrhea, reflux, or dumping.  Pt does report some mild constipation, but reports has a BM at least every 2 days.  Pt was taking a fiber gummy.    Physical Activity  Types of exercise: mainly cardio- pt reports aims for 5 miles per day stationary bike or walking.  No resistance training.  Current physical limitations: none noted    Psychosocial Assessment   Support systems: spouse  Socioeconomic factors: works FT for [employer]-not currently working per pt report.  Pt reports when she is working she works second shift.    Nutrition Diagnosis-continued/improving  Diagnosis: Overweight / Obesity (NC-3.3) and Altered GI function (NC-1.4)  Related to: Altered GI function  As Evidenced by: BMI >25 and s/p bariatric surgery, 6 months post-op     Nutrition Prescription: Recommend the following diet  Low fat, Low sugar, High protein, and Regular    Interventions and Teaching   Patient educated on post-op nutrition guidelines.    Patient educated and handouts provided.  Surgical  "changes to stomach / GI  Capacity of post-surgery stomach  Diet progression  Adequate hydration  Sugar and fat restriction to decrease \"dumping syndrome\"  Expected weight loss  Weight loss plateaus/ possibility of weight regain  Exercise  Nutrition considerations after surgery  Protein supplements  Meal planning and preparation  Appropriate carbohydrate, protein, and fat intake, and food/fluid choices to maximize safe weight loss, nutrient intake, and tolerance   Dietary and lifestyle changes  Possible problems with poor eating habits  Techniques for self monitoring and keeping daily food journal  Potential for food intolerance after surgery, and ways to deal with them including: lactose intolerance, nausea, reflux, vomiting, diarrhea, food intolerance, appetite changes, gas  Vitamin / Mineral supplementation of Multivitamin with minerals and Calcium  7/30/2024:  Provided pt with new bariatric manual.  Reviewed support options in introduction chapter.  Reviewed features in Bridj bhavani.  Reviewed pre-op goals checklist in ch 2 with pt.  Reviewed post-op diet progression, nutrition rules and facts, post-op portion size progression, nutrition tracker goals.  Reviewed post-op constipation tips.  Provided pt with chewable and capsule bariatric multivitamin and calcium comparison charts.  10/30/24:  Reviewed body composition analysis.  Calculated calorie and macronutrient goals for pt: 1200kcal, 30% fats, 25% PRO, 45% CHO.  Discussed choosing healthy, whole grain carbohydrates, limit to 1/4 cup with meals.  Suggested pt look into Mediterranean diet cookbooks.  Printed 5 day 1200 calorie sample menu for pt.  Printed carbohydrate portion sizes handout for pt.    Education provided to: patient  Barriers to learning: No barriers identified  Readiness to change: action  Comprehension: verbalizes understanding   Expected Compliance: excellent    Evaluation/Monitoring   Eating pattern as discussed Tolerance of nutrition " prescription Body weight Lab values Physical activity Bowel pattern    Goals  Food journal, Exercise 30 minutes 5 times per week, Eat 3 meals per day, Eliminate mindless snacking, and incorporate resistance exercises at least two days a week.     Time Spent:   45 Minutes

## 2024-12-06 ENCOUNTER — TELEPHONE (OUTPATIENT)
Dept: BARIATRICS | Facility: CLINIC | Age: 54
End: 2024-12-06

## 2025-03-27 ENCOUNTER — HOSPITAL ENCOUNTER (OUTPATIENT)
Dept: RADIOLOGY | Age: 55
Discharge: HOME | End: 2025-03-27
Payer: COMMERCIAL

## 2025-03-27 DIAGNOSIS — Z12.31 ENCOUNTER FOR SCREENING MAMMOGRAM FOR MALIGNANT NEOPLASM OF BREAST: ICD-10-CM

## 2025-03-27 PROCEDURE — 77067 SCR MAMMO BI INCL CAD: CPT

## 2025-03-27 PROCEDURE — 77063 BREAST TOMOSYNTHESIS BI: CPT

## 2025-05-13 ENCOUNTER — TELEPHONE (OUTPATIENT)
Age: 55
End: 2025-05-13

## 2025-05-14 NOTE — TELEPHONE ENCOUNTER
Rec'd call from patient requesting an update on scheduling consult for pann. I explained process of scheduling. Patient verbalized understanding and will wait to be contacted by Erma.

## 2025-05-19 ENCOUNTER — TELEPHONE (OUTPATIENT)
Dept: PLASTIC SURGERY | Facility: CLINIC | Age: 55
End: 2025-05-19

## 2025-05-19 NOTE — TELEPHONE ENCOUNTER
Spoke to patient about process for panniculectomy.  I let her know there is no specific criteria I can see on her insurance website and she will reach out to find exact criteria and email me.

## 2025-05-19 NOTE — TELEPHONE ENCOUNTER
Left voicemail for patient to call if interested in discussing panniculectomy.  Nothing on file and no referral.  Patient has [employer] and no criteria listed on line.  Will ask patient to verify with her insurance.

## 2025-05-22 ENCOUNTER — TELEPHONE (OUTPATIENT)
Dept: PLASTIC SURGERY | Facility: CLINIC | Age: 55
End: 2025-05-22

## 2025-05-22 NOTE — TELEPHONE ENCOUNTER
Patient called to review process for documentation for insurance panniculectomy.  Gave our fax number so patient can have outside records sent in documenting being on Nystatin and other issues.  Will let me know when sent so once there is enough criteria we can make consultation.

## (undated) DEVICE — ELECTRODE EZ CLEAN BLADE -0012

## (undated) DEVICE — NEEDLE BLUNT 18 G X 1 1/2IN

## (undated) DEVICE — CANNISTER WASTE IMPLOSION PROOF

## (undated) DEVICE — SYRINGE 3ML LL

## (undated) DEVICE — NEPTUNE E-SEP SMOKE EVACUATION PENCIL, COATED, 70MM BLADE, PUSH BUTTON SWITCH: Brand: NEPTUNE E-SEP

## (undated) DEVICE — SYRINGE 30ML LL

## (undated) DEVICE — BETHLEHEM UNIVERSAL BREAST PK: Brand: CARDINAL HEALTH

## (undated) DEVICE — INTENDED FOR TISSUE SEPARATION, AND OTHER PROCEDURES THAT REQUIRE A SHARP SURGICAL BLADE TO PUNCTURE OR CUT.: Brand: BARD-PARKER ® SAFETYLOCK CARBON RIB-BACK BLADES

## (undated) DEVICE — SUT PROLENE 3-0 PC-5 18 IN 8632G

## (undated) DEVICE — SINGLE PORT MANIFOLD: Brand: NEPTUNE 2

## (undated) DEVICE — OCCLUSIVE GAUZE STRIP,3% BISMUTH TRIBROMOPHENATE IN PETROLATUM BLEND: Brand: XEROFORM

## (undated) DEVICE — DISPOSABLE OR TOWEL: Brand: CARDINAL HEALTH

## (undated) DEVICE — Device

## (undated) DEVICE — SYRINGE 10ML LL CONTROL TOP

## (undated) DEVICE — 3M™ STERI-STRIP™ REINFORCED ADHESIVE SKIN CLOSURES, R1547, 1/2 IN X 4 IN (12 MM X 100 MM), 6 STRIPS/ENVELOPE: Brand: 3M™ STERI-STRIP™

## (undated) DEVICE — JACKSON-PRATT 100CC BULB RESERVOIR: Brand: CARDINAL HEALTH

## (undated) DEVICE — DRAPE EQUIPMENT RF WAND

## (undated) DEVICE — CANNULA 4MM TRI-PORT III 22CM 10 MM PORT

## (undated) DEVICE — TUBING ASPIRATION LIPOSUCTION SET 12FT

## (undated) DEVICE — KERLIX BANDAGE ROLL: Brand: KERLIX

## (undated) DEVICE — WET SKIN PREP TRAY: Brand: MEDLINE INDUSTRIES, INC.

## (undated) DEVICE — NEEDLE FILTER 5 MICR 19G X 1.5IN

## (undated) DEVICE — SUT VICRYL 4-0 PS-2 18 IN J496G

## (undated) DEVICE — SKIN MARKER DUAL TIP WITH RULER CAP, FLEXIBLE RULER AND LABELS: Brand: DEVON

## (undated) DEVICE — STERILE POLYISOPRENE POWDER-FREE SURGICAL GLOVES: Brand: PROTEXIS

## (undated) DEVICE — SUT VICRYL 2-0 J459H

## (undated) DEVICE — JP CHAN DRN SIL RND 19FR FULL W/TRO: Brand: JACKSON-PRATT

## (undated) DEVICE — NEEDLE 25G X 1 1/2

## (undated) DEVICE — BRA SURGICAL SZ 2XL (42-45)

## (undated) DEVICE — SPONGE LAP 18 X 18 IN STRL RFD

## (undated) DEVICE — DRAPE SHEET THREE QUARTER

## (undated) DEVICE — PACK UNIVERSAL DRAPES SUB-Q ICD

## (undated) DEVICE — SCD SEQUENTIAL COMPRESSION COMFORT SLEEVE MEDIUM KNEE LENGTH: Brand: KENDALL SCD